# Patient Record
Sex: MALE | Race: WHITE | NOT HISPANIC OR LATINO | Employment: FULL TIME | URBAN - METROPOLITAN AREA
[De-identification: names, ages, dates, MRNs, and addresses within clinical notes are randomized per-mention and may not be internally consistent; named-entity substitution may affect disease eponyms.]

---

## 2023-05-25 LAB
ALANINE AMINOTRANSFERASE (SGPT) (U/L) IN SER/PLAS: 15 U/L (ref 10–52)
ALBUMIN (G/DL) IN SER/PLAS: 4.1 G/DL (ref 3.4–5)
ALKALINE PHOSPHATASE (U/L) IN SER/PLAS: 64 U/L (ref 33–120)
ASPARTATE AMINOTRANSFERASE (SGOT) (U/L) IN SER/PLAS: 19 U/L (ref 9–39)
BILIRUBIN DIRECT (MG/DL) IN SER/PLAS: 0.1 MG/DL (ref 0–0.3)
BILIRUBIN TOTAL (MG/DL) IN SER/PLAS: 0.4 MG/DL (ref 0–1.2)
ERYTHROCYTE DISTRIBUTION WIDTH (RATIO) BY AUTOMATED COUNT: 13.7 % (ref 11.5–14.5)
ERYTHROCYTE MEAN CORPUSCULAR HEMOGLOBIN CONCENTRATION (G/DL) BY AUTOMATED: 31.5 G/DL (ref 32–36)
ERYTHROCYTE MEAN CORPUSCULAR VOLUME (FL) BY AUTOMATED COUNT: 97 FL (ref 80–100)
ERYTHROCYTES (10*6/UL) IN BLOOD BY AUTOMATED COUNT: 4.94 X10E12/L (ref 4.5–5.9)
HEMATOCRIT (%) IN BLOOD BY AUTOMATED COUNT: 47.9 % (ref 41–52)
HEMOGLOBIN (G/DL) IN BLOOD: 15.1 G/DL (ref 13.5–17.5)
LEUKOCYTES (10*3/UL) IN BLOOD BY AUTOMATED COUNT: 8.6 X10E9/L (ref 4.4–11.3)
PLATELETS (10*3/UL) IN BLOOD AUTOMATED COUNT: 160 X10E9/L (ref 150–450)
PROTEIN TOTAL: 6.6 G/DL (ref 6.4–8.2)

## 2023-05-26 LAB
PROSTATE SPECIFIC AG (NG/ML) IN SER/PLAS: 1.74 NG/ML (ref 0–4)
TESTOSTERONE (NG/DL) IN SER/PLAS: 640 NG/DL (ref 240–1000)

## 2023-09-15 ENCOUNTER — HOSPITAL ENCOUNTER (OUTPATIENT)
Dept: DATA CONVERSION | Facility: HOSPITAL | Age: 58
End: 2023-09-15

## 2023-09-15 DIAGNOSIS — R14.0 ABDOMINAL DISTENSION (GASEOUS): ICD-10-CM

## 2023-09-15 DIAGNOSIS — R10.9 UNSPECIFIED ABDOMINAL PAIN: ICD-10-CM

## 2023-10-02 ENCOUNTER — APPOINTMENT (OUTPATIENT)
Dept: RADIOLOGY | Facility: HOSPITAL | Age: 58
End: 2023-10-02
Payer: COMMERCIAL

## 2023-10-03 PROCEDURE — 88304 TISSUE EXAM BY PATHOLOGIST: CPT | Performed by: PATHOLOGY

## 2023-10-03 PROCEDURE — 88304 TISSUE EXAM BY PATHOLOGIST: CPT

## 2023-10-04 ENCOUNTER — LAB REQUISITION (OUTPATIENT)
Dept: LAB | Facility: HOSPITAL | Age: 58
End: 2023-10-04
Payer: COMMERCIAL

## 2023-10-04 DIAGNOSIS — R22.31 LOCALIZED SWELLING, MASS AND LUMP, RIGHT UPPER LIMB: ICD-10-CM

## 2023-10-05 LAB
LABORATORY COMMENT REPORT: NORMAL
PATH REPORT.FINAL DX SPEC: NORMAL
PATH REPORT.GROSS SPEC: NORMAL
PATH REPORT.TOTAL CANCER: NORMAL

## 2023-10-07 ENCOUNTER — HOSPITAL ENCOUNTER (OUTPATIENT)
Dept: RADIOLOGY | Facility: HOSPITAL | Age: 58
Discharge: HOME | End: 2023-10-07
Payer: COMMERCIAL

## 2023-10-07 DIAGNOSIS — R10.9 UNSPECIFIED ABDOMINAL PAIN: ICD-10-CM

## 2023-10-07 DIAGNOSIS — R14.0 ABDOMINAL DISTENSION (GASEOUS): ICD-10-CM

## 2023-10-07 PROCEDURE — 2550000001 HC RX 255 CONTRASTS: Performed by: FAMILY MEDICINE

## 2023-10-07 PROCEDURE — 74177 CT ABD & PELVIS W/CONTRAST: CPT

## 2023-10-07 RX ADMIN — IOHEXOL 75 ML: 350 INJECTION, SOLUTION INTRAVENOUS at 09:57

## 2023-10-11 ENCOUNTER — APPOINTMENT (OUTPATIENT)
Dept: PRIMARY CARE | Facility: CLINIC | Age: 58
End: 2023-10-11
Payer: COMMERCIAL

## 2023-10-19 ENCOUNTER — TELEPHONE (OUTPATIENT)
Dept: PRIMARY CARE | Facility: CLINIC | Age: 58
End: 2023-10-19
Payer: COMMERCIAL

## 2023-10-19 DIAGNOSIS — J90 PLEURAL EFFUSION: ICD-10-CM

## 2023-10-19 DIAGNOSIS — K82.9 GALLBLADDER DISORDER: Primary | ICD-10-CM

## 2023-11-20 PROBLEM — E78.5 HYPERLIPIDEMIA: Status: ACTIVE | Noted: 2023-11-20

## 2023-11-20 PROBLEM — F90.9 ATTENTION DEFICIT HYPERACTIVITY DISORDER (ADHD): Status: ACTIVE | Noted: 2023-11-20

## 2023-11-20 PROBLEM — K22.719 BARRETT'S ESOPHAGUS WITH DYSPLASIA: Status: ACTIVE | Noted: 2017-07-17

## 2023-11-20 PROBLEM — G25.0 ESSENTIAL TREMOR: Status: ACTIVE | Noted: 2023-11-20

## 2023-11-20 PROBLEM — Z78.9 MEDICAL HOME PATIENT: Status: ACTIVE | Noted: 2023-11-20

## 2023-11-20 PROBLEM — E04.1 THYROID NODULE: Status: ACTIVE | Noted: 2023-11-20

## 2023-11-20 PROBLEM — F41.9 ANXIETY AND DEPRESSION: Status: ACTIVE | Noted: 2023-11-20

## 2023-11-20 PROBLEM — R53.81 MALAISE AND FATIGUE: Status: RESOLVED | Noted: 2021-01-06 | Resolved: 2023-11-20

## 2023-11-20 PROBLEM — G47.33 OBSTRUCTIVE SLEEP APNEA SYNDROME: Status: ACTIVE | Noted: 2021-01-06

## 2023-11-20 PROBLEM — E23.7: Status: ACTIVE | Noted: 2023-11-20

## 2023-11-20 PROBLEM — E66.9 OBESITY: Status: ACTIVE | Noted: 2023-11-20

## 2023-11-20 PROBLEM — E29.1 HYPOGONADISM IN MALE: Status: ACTIVE | Noted: 2017-04-18

## 2023-11-20 PROBLEM — I10 ESSENTIAL HYPERTENSION: Status: ACTIVE | Noted: 2023-11-20

## 2023-11-20 PROBLEM — R41.3 MEMORY LOSS: Status: RESOLVED | Noted: 2023-11-20 | Resolved: 2023-11-20

## 2023-11-20 PROBLEM — K21.9 ESOPHAGEAL REFLUX: Status: ACTIVE | Noted: 2023-11-20

## 2023-11-20 PROBLEM — F32.A ANXIETY AND DEPRESSION: Status: ACTIVE | Noted: 2023-11-20

## 2023-11-20 PROBLEM — R53.83 MALAISE AND FATIGUE: Status: RESOLVED | Noted: 2021-01-06 | Resolved: 2023-11-20

## 2023-11-20 PROBLEM — E55.9 VITAMIN D DEFICIENCY: Status: ACTIVE | Noted: 2023-11-20

## 2023-11-20 PROBLEM — R22.31 MASS OF FINGER OF RIGHT HAND: Status: ACTIVE | Noted: 2018-03-12

## 2023-11-20 PROBLEM — F32.9 MAJOR DEPRESSIVE DISORDER, SINGLE EPISODE, UNSPECIFIED: Status: ACTIVE | Noted: 2023-11-20

## 2023-11-20 PROBLEM — R07.9 CHEST PAIN: Status: RESOLVED | Noted: 2021-11-30 | Resolved: 2023-11-20

## 2023-11-20 RX ORDER — PHENYLPROPANOLAMINE/CLEMASTINE 75-1.34MG
200 TABLET, EXTENDED RELEASE ORAL AS NEEDED
COMMUNITY

## 2023-11-20 RX ORDER — ALBUTEROL SULFATE 90 UG/1
2 AEROSOL, METERED RESPIRATORY (INHALATION) EVERY 4 HOURS PRN
COMMUNITY
Start: 2020-12-15

## 2023-11-20 RX ORDER — PROPRANOLOL HYDROCHLORIDE 10 MG/1
5-10 TABLET ORAL 3 TIMES DAILY PRN
COMMUNITY

## 2023-11-20 RX ORDER — NAPROXEN SODIUM 220 MG/1
81 TABLET, FILM COATED ORAL DAILY
COMMUNITY

## 2023-11-20 RX ORDER — ASCORBIC ACID 500 MG
500 TABLET ORAL DAILY
COMMUNITY
Start: 2021-06-10

## 2023-11-20 RX ORDER — CLOMIPHENE CITRATE 50 MG/1
50 TABLET ORAL
COMMUNITY
End: 2024-04-22 | Stop reason: WASHOUT

## 2023-11-20 RX ORDER — PHENYLEPHRINE HCL 10 MG
500 TABLET ORAL DAILY
COMMUNITY

## 2023-11-20 RX ORDER — ONDANSETRON 4 MG/1
8 TABLET, FILM COATED ORAL EVERY 12 HOURS PRN
COMMUNITY
Start: 2023-05-10

## 2023-11-20 RX ORDER — MV-MIN/FOLIC/K1/LYCOPEN/LUTEIN 300-60 MCG
1 TABLET ORAL DAILY
COMMUNITY
Start: 2021-06-10

## 2023-11-20 RX ORDER — ATORVASTATIN CALCIUM 10 MG/1
10 TABLET, FILM COATED ORAL NIGHTLY PRN
COMMUNITY
Start: 2022-05-05 | End: 2024-04-26 | Stop reason: SDUPTHER

## 2023-11-20 RX ORDER — FLUTICASONE FUROATE AND VILANTEROL TRIFENATATE 100; 25 UG/1; UG/1
1 POWDER RESPIRATORY (INHALATION) DAILY
COMMUNITY
Start: 2023-08-27

## 2023-11-20 RX ORDER — TAMSULOSIN HYDROCHLORIDE 0.4 MG/1
0.4 CAPSULE ORAL DAILY
COMMUNITY
Start: 2023-08-12

## 2023-11-20 RX ORDER — MULTIVITAMIN
1 TABLET ORAL
COMMUNITY
Start: 2011-03-03

## 2023-11-20 RX ORDER — PANTOPRAZOLE SODIUM 40 MG/1
40 TABLET, DELAYED RELEASE ORAL
COMMUNITY

## 2023-11-20 RX ORDER — ACETAMINOPHEN 500 MG
5000 TABLET ORAL EVERY 24 HOURS
COMMUNITY

## 2024-02-15 ENCOUNTER — TELEPHONE (OUTPATIENT)
Dept: PRIMARY CARE | Facility: CLINIC | Age: 59
End: 2024-02-15
Payer: COMMERCIAL

## 2024-04-19 ENCOUNTER — APPOINTMENT (OUTPATIENT)
Dept: PRIMARY CARE | Facility: CLINIC | Age: 59
End: 2024-04-19
Payer: COMMERCIAL

## 2024-04-22 ENCOUNTER — TELEPHONE (OUTPATIENT)
Dept: PRIMARY CARE | Facility: CLINIC | Age: 59
End: 2024-04-22

## 2024-04-22 ENCOUNTER — OFFICE VISIT (OUTPATIENT)
Dept: PRIMARY CARE | Facility: CLINIC | Age: 59
End: 2024-04-22
Payer: COMMERCIAL

## 2024-04-22 VITALS
TEMPERATURE: 97.4 F | HEART RATE: 83 BPM | RESPIRATION RATE: 16 BRPM | WEIGHT: 220 LBS | HEIGHT: 68 IN | BODY MASS INDEX: 33.34 KG/M2 | OXYGEN SATURATION: 96 % | DIASTOLIC BLOOD PRESSURE: 78 MMHG | SYSTOLIC BLOOD PRESSURE: 108 MMHG

## 2024-04-22 DIAGNOSIS — R60.9 EDEMA, UNSPECIFIED TYPE: ICD-10-CM

## 2024-04-22 DIAGNOSIS — G62.9 NEUROPATHY: ICD-10-CM

## 2024-04-22 DIAGNOSIS — L30.0 NUMMULAR ECZEMA: Primary | ICD-10-CM

## 2024-04-22 PROCEDURE — 3074F SYST BP LT 130 MM HG: CPT | Performed by: FAMILY MEDICINE

## 2024-04-22 PROCEDURE — 99214 OFFICE O/P EST MOD 30 MIN: CPT | Performed by: FAMILY MEDICINE

## 2024-04-22 PROCEDURE — 1036F TOBACCO NON-USER: CPT | Performed by: FAMILY MEDICINE

## 2024-04-22 PROCEDURE — 3078F DIAST BP <80 MM HG: CPT | Performed by: FAMILY MEDICINE

## 2024-04-22 RX ORDER — TRIAMCINOLONE ACETONIDE 1 MG/G
CREAM TOPICAL 2 TIMES DAILY
Qty: 60 G | Refills: 2 | Status: SHIPPED | OUTPATIENT
Start: 2024-04-22

## 2024-04-22 ASSESSMENT — COLUMBIA-SUICIDE SEVERITY RATING SCALE - C-SSRS
6. HAVE YOU EVER DONE ANYTHING, STARTED TO DO ANYTHING, OR PREPARED TO DO ANYTHING TO END YOUR LIFE?: NO
1. IN THE PAST MONTH, HAVE YOU WISHED YOU WERE DEAD OR WISHED YOU COULD GO TO SLEEP AND NOT WAKE UP?: NO
2. HAVE YOU ACTUALLY HAD ANY THOUGHTS OF KILLING YOURSELF?: NO

## 2024-04-22 ASSESSMENT — ENCOUNTER SYMPTOMS
OCCASIONAL FEELINGS OF UNSTEADINESS: 0
LOSS OF SENSATION IN FEET: 0
DEPRESSION: 0
LEG PAIN: 1

## 2024-04-22 ASSESSMENT — PAIN SCALES - GENERAL: PAINLEVEL: 4

## 2024-04-22 ASSESSMENT — PATIENT HEALTH QUESTIONNAIRE - PHQ9
1. LITTLE INTEREST OR PLEASURE IN DOING THINGS: NOT AT ALL
2. FEELING DOWN, DEPRESSED OR HOPELESS: NOT AT ALL
SUM OF ALL RESPONSES TO PHQ9 QUESTIONS 1 AND 2: 0

## 2024-04-22 NOTE — PROGRESS NOTES
"Subjective   Patient ID: Boston Daniel is a 59 y.o. male who presents for Leg Pain (Pt is here with complaints of bilateral LE with some itching and burning and some skin discoloration.).    Leg Pain      he takes oral b 12 regularly.  He sees Dr Gómez for balance issues and has upcoming appt.  He has had some numbness in his feet for about 6 months also itching and circular patches of scale of lower extremities.     Review of Systems see HPI    Objective   /78 (BP Location: Left arm, Patient Position: Sitting, BP Cuff Size: Adult)   Pulse 83   Temp 36.3 °C (97.4 °F) (Temporal)   Resp 16   Ht 1.727 m (5' 8\")   Wt 99.8 kg (220 lb)   SpO2 96%   BMI 33.45 kg/m²     Physical Exam  Constitutional:       General: He is not in acute distress.     Appearance: Normal appearance.   Cardiovascular:      Rate and Rhythm: Normal rate and regular rhythm.      Heart sounds: No murmur heard.  Pulmonary:      Breath sounds: Normal breath sounds. No wheezing.   Skin:     Comments: 3-4 scattered circular patches bilateral lower extremities.  No central clearing.  Mild edema bilaterally and some moderate varicosities.    Neurological:      Mental Status: He is alert.         Assessment/Plan   Problem List Items Addressed This Visit    None  Visit Diagnoses         Codes    Nummular eczema    -  Primary L30.0    Relevant Medications    triamcinolone (Kenalog) 0.1 % cream    Neuropathy     G62.9    Edema, unspecified type     R60.9          Follow up with neuro near future.  Continue compression stockings.      "

## 2024-04-26 DIAGNOSIS — E78.5 HYPERLIPIDEMIA, UNSPECIFIED HYPERLIPIDEMIA TYPE: Primary | ICD-10-CM

## 2024-04-29 RX ORDER — ATORVASTATIN CALCIUM 10 MG/1
10 TABLET, FILM COATED ORAL DAILY
Qty: 90 TABLET | Refills: 1 | Status: SHIPPED | OUTPATIENT
Start: 2024-04-29

## 2024-06-14 DIAGNOSIS — F41.9 ANXIETY: Primary | ICD-10-CM

## 2024-06-17 RX ORDER — ESCITALOPRAM OXALATE 10 MG/1
30 TABLET ORAL DAILY
Qty: 270 TABLET | Refills: 0 | Status: SHIPPED | OUTPATIENT
Start: 2024-06-17

## 2024-06-18 ENCOUNTER — LAB (OUTPATIENT)
Dept: LAB | Facility: LAB | Age: 59
End: 2024-06-18
Payer: COMMERCIAL

## 2024-06-18 ENCOUNTER — HOSPITAL ENCOUNTER (OUTPATIENT)
Dept: RADIOLOGY | Facility: CLINIC | Age: 59
Discharge: HOME | End: 2024-06-18
Payer: COMMERCIAL

## 2024-06-18 DIAGNOSIS — D29.32 BENIGN NEOPLASM OF LEFT EPIDIDYMIS: ICD-10-CM

## 2024-06-18 DIAGNOSIS — N40.1 BENIGN PROSTATIC HYPERPLASIA WITH LOWER URINARY TRACT SYMPTOMS: Primary | ICD-10-CM

## 2024-06-18 LAB — PSA SERPL-MCNC: 1.6 NG/ML

## 2024-06-18 PROCEDURE — 36415 COLL VENOUS BLD VENIPUNCTURE: CPT

## 2024-06-18 PROCEDURE — 93975 VASCULAR STUDY: CPT

## 2024-06-18 PROCEDURE — 84153 ASSAY OF PSA TOTAL: CPT

## 2024-06-18 PROCEDURE — 76870 US EXAM SCROTUM: CPT | Performed by: STUDENT IN AN ORGANIZED HEALTH CARE EDUCATION/TRAINING PROGRAM

## 2024-07-01 ENCOUNTER — LAB (OUTPATIENT)
Dept: LAB | Facility: LAB | Age: 59
End: 2024-07-01
Payer: COMMERCIAL

## 2024-07-01 DIAGNOSIS — R93.819 ABNORMAL RADIOLOGIC FINDINGS ON DIAGNOSTIC IMAGING OF UNSPECIFIED TESTICLE: ICD-10-CM

## 2024-07-01 DIAGNOSIS — R93.819 ABNORMAL RADIOLOGIC FINDINGS ON DIAGNOSTIC IMAGING OF UNSPECIFIED TESTICLE: Primary | ICD-10-CM

## 2024-07-01 LAB — HCG SERPL-ACNC: <1 MIU/ML

## 2024-07-01 PROCEDURE — 83615 LACTATE (LD) (LDH) ENZYME: CPT

## 2024-07-01 PROCEDURE — 36415 COLL VENOUS BLD VENIPUNCTURE: CPT

## 2024-07-01 PROCEDURE — 84702 CHORIONIC GONADOTROPIN TEST: CPT

## 2024-07-02 LAB — LDH SERPL-CCNC: 170 U/L (ref 91–227)

## 2024-07-10 ENCOUNTER — APPOINTMENT (OUTPATIENT)
Dept: RADIOLOGY | Facility: HOSPITAL | Age: 59
End: 2024-07-10
Payer: COMMERCIAL

## 2024-07-10 ENCOUNTER — HOSPITAL ENCOUNTER (EMERGENCY)
Facility: HOSPITAL | Age: 59
Discharge: HOME | End: 2024-07-10
Attending: EMERGENCY MEDICINE
Payer: COMMERCIAL

## 2024-07-10 ENCOUNTER — OFFICE VISIT (OUTPATIENT)
Dept: NEUROLOGY | Facility: CLINIC | Age: 59
End: 2024-07-10
Payer: COMMERCIAL

## 2024-07-10 ENCOUNTER — LAB (OUTPATIENT)
Dept: LAB | Facility: LAB | Age: 59
End: 2024-07-10
Payer: COMMERCIAL

## 2024-07-10 VITALS
HEART RATE: 79 BPM | RESPIRATION RATE: 16 BRPM | HEIGHT: 68 IN | WEIGHT: 217 LBS | OXYGEN SATURATION: 95 % | BODY MASS INDEX: 32.89 KG/M2 | SYSTOLIC BLOOD PRESSURE: 145 MMHG | TEMPERATURE: 97.7 F | DIASTOLIC BLOOD PRESSURE: 94 MMHG

## 2024-07-10 DIAGNOSIS — R20.2 NUMBNESS AND TINGLING: Primary | ICD-10-CM

## 2024-07-10 DIAGNOSIS — R10.32 GROIN PAIN, CHRONIC, LEFT: Primary | ICD-10-CM

## 2024-07-10 DIAGNOSIS — G89.29 GROIN PAIN, CHRONIC, LEFT: Primary | ICD-10-CM

## 2024-07-10 DIAGNOSIS — G25.0 ESSENTIAL TREMOR: ICD-10-CM

## 2024-07-10 DIAGNOSIS — R20.0 NUMBNESS AND TINGLING: Primary | ICD-10-CM

## 2024-07-10 DIAGNOSIS — R20.0 NUMBNESS AND TINGLING: ICD-10-CM

## 2024-07-10 DIAGNOSIS — R20.2 NUMBNESS AND TINGLING: ICD-10-CM

## 2024-07-10 LAB
ANION GAP SERPL CALC-SCNC: 8 MMOL/L
APPEARANCE UR: CLEAR
BASOPHILS # BLD AUTO: 0.03 X10*3/UL (ref 0–0.1)
BASOPHILS NFR BLD AUTO: 0.3 %
BILIRUB UR STRIP.AUTO-MCNC: NEGATIVE MG/DL
BUN SERPL-MCNC: 13 MG/DL (ref 8–25)
CALCIUM SERPL-MCNC: 9.3 MG/DL (ref 8.5–10.4)
CHLORIDE SERPL-SCNC: 104 MMOL/L (ref 97–107)
CO2 SERPL-SCNC: 28 MMOL/L (ref 24–31)
COLOR UR: YELLOW
CREAT SERPL-MCNC: 0.9 MG/DL (ref 0.4–1.6)
EGFRCR SERPLBLD CKD-EPI 2021: >90 ML/MIN/1.73M*2
EOSINOPHIL # BLD AUTO: 0.1 X10*3/UL (ref 0–0.7)
EOSINOPHIL NFR BLD AUTO: 1.1 %
ERYTHROCYTE [DISTWIDTH] IN BLOOD BY AUTOMATED COUNT: 13.5 % (ref 11.5–14.5)
ERYTHROCYTE [SEDIMENTATION RATE] IN BLOOD BY WESTERGREN METHOD: 16 MM/H (ref 0–20)
GLUCOSE SERPL-MCNC: 98 MG/DL (ref 65–99)
GLUCOSE UR STRIP.AUTO-MCNC: NORMAL MG/DL
HCT VFR BLD AUTO: 45.3 % (ref 41–52)
HGB BLD-MCNC: 14.8 G/DL (ref 13.5–17.5)
IMM GRANULOCYTES # BLD AUTO: 0.04 X10*3/UL (ref 0–0.7)
IMM GRANULOCYTES NFR BLD AUTO: 0.4 % (ref 0–0.9)
KETONES UR STRIP.AUTO-MCNC: NEGATIVE MG/DL
LEUKOCYTE ESTERASE UR QL STRIP.AUTO: NEGATIVE
LYMPHOCYTES # BLD AUTO: 2.07 X10*3/UL (ref 1.2–4.8)
LYMPHOCYTES NFR BLD AUTO: 22.3 %
MCH RBC QN AUTO: 30 PG (ref 26–34)
MCHC RBC AUTO-ENTMCNC: 32.7 G/DL (ref 32–36)
MCV RBC AUTO: 92 FL (ref 80–100)
MONOCYTES # BLD AUTO: 0.72 X10*3/UL (ref 0.1–1)
MONOCYTES NFR BLD AUTO: 7.8 %
NEUTROPHILS # BLD AUTO: 6.32 X10*3/UL (ref 1.2–7.7)
NEUTROPHILS NFR BLD AUTO: 68.1 %
NITRITE UR QL STRIP.AUTO: NEGATIVE
NRBC BLD-RTO: 0 /100 WBCS (ref 0–0)
PH UR STRIP.AUTO: 7 [PH]
PLATELET # BLD AUTO: 202 X10*3/UL (ref 150–450)
POTASSIUM SERPL-SCNC: 4.2 MMOL/L (ref 3.4–5.1)
PROT UR STRIP.AUTO-MCNC: NEGATIVE MG/DL
RBC # BLD AUTO: 4.93 X10*6/UL (ref 4.5–5.9)
RBC # UR STRIP.AUTO: NEGATIVE /UL
SODIUM SERPL-SCNC: 140 MMOL/L (ref 133–145)
SP GR UR STRIP.AUTO: 1.01
TSH SERPL DL<=0.05 MIU/L-ACNC: 0.93 MIU/L (ref 0.27–4.2)
UROBILINOGEN UR STRIP.AUTO-MCNC: NORMAL MG/DL
VIT B12 SERPL-MCNC: >2000 PG/ML (ref 211–946)
WBC # BLD AUTO: 9.3 X10*3/UL (ref 4.4–11.3)

## 2024-07-10 PROCEDURE — 96361 HYDRATE IV INFUSION ADD-ON: CPT

## 2024-07-10 PROCEDURE — 84443 ASSAY THYROID STIM HORMONE: CPT

## 2024-07-10 PROCEDURE — 96375 TX/PRO/DX INJ NEW DRUG ADDON: CPT

## 2024-07-10 PROCEDURE — 82374 ASSAY BLOOD CARBON DIOXIDE: CPT | Performed by: EMERGENCY MEDICINE

## 2024-07-10 PROCEDURE — 74176 CT ABD & PELVIS W/O CONTRAST: CPT

## 2024-07-10 PROCEDURE — 81003 URINALYSIS AUTO W/O SCOPE: CPT | Performed by: EMERGENCY MEDICINE

## 2024-07-10 PROCEDURE — 99285 EMERGENCY DEPT VISIT HI MDM: CPT | Mod: 25

## 2024-07-10 PROCEDURE — 96374 THER/PROPH/DIAG INJ IV PUSH: CPT

## 2024-07-10 PROCEDURE — 82607 VITAMIN B-12: CPT

## 2024-07-10 PROCEDURE — 99214 OFFICE O/P EST MOD 30 MIN: CPT | Performed by: PSYCHIATRY & NEUROLOGY

## 2024-07-10 PROCEDURE — 1036F TOBACCO NON-USER: CPT | Performed by: PSYCHIATRY & NEUROLOGY

## 2024-07-10 PROCEDURE — 85652 RBC SED RATE AUTOMATED: CPT

## 2024-07-10 PROCEDURE — 85025 COMPLETE CBC W/AUTO DIFF WBC: CPT | Performed by: EMERGENCY MEDICINE

## 2024-07-10 PROCEDURE — 93975 VASCULAR STUDY: CPT

## 2024-07-10 PROCEDURE — 93975 VASCULAR STUDY: CPT | Performed by: RADIOLOGY

## 2024-07-10 PROCEDURE — 74176 CT ABD & PELVIS W/O CONTRAST: CPT | Performed by: RADIOLOGY

## 2024-07-10 PROCEDURE — 36415 COLL VENOUS BLD VENIPUNCTURE: CPT | Performed by: EMERGENCY MEDICINE

## 2024-07-10 PROCEDURE — 36415 COLL VENOUS BLD VENIPUNCTURE: CPT

## 2024-07-10 PROCEDURE — 86334 IMMUNOFIX E-PHORESIS SERUM: CPT

## 2024-07-10 PROCEDURE — 76870 US EXAM SCROTUM: CPT | Performed by: RADIOLOGY

## 2024-07-10 PROCEDURE — 84165 PROTEIN E-PHORESIS SERUM: CPT

## 2024-07-10 PROCEDURE — 2500000004 HC RX 250 GENERAL PHARMACY W/ HCPCS (ALT 636 FOR OP/ED): Performed by: EMERGENCY MEDICINE

## 2024-07-10 PROCEDURE — 84155 ASSAY OF PROTEIN SERUM: CPT

## 2024-07-10 RX ORDER — ONDANSETRON HYDROCHLORIDE 2 MG/ML
4 INJECTION, SOLUTION INTRAVENOUS ONCE
Status: COMPLETED | OUTPATIENT
Start: 2024-07-10 | End: 2024-07-10

## 2024-07-10 RX ORDER — KETOROLAC TROMETHAMINE 30 MG/ML
30 INJECTION, SOLUTION INTRAMUSCULAR; INTRAVENOUS ONCE
Status: COMPLETED | OUTPATIENT
Start: 2024-07-10 | End: 2024-07-10

## 2024-07-10 RX ORDER — NAPROXEN 500 MG/1
500 TABLET ORAL 2 TIMES DAILY PRN
Qty: 14 TABLET | Refills: 0 | Status: SHIPPED | OUTPATIENT
Start: 2024-07-10 | End: 2024-07-17

## 2024-07-10 ASSESSMENT — PAIN - FUNCTIONAL ASSESSMENT
PAIN_FUNCTIONAL_ASSESSMENT: 0-10
PAIN_FUNCTIONAL_ASSESSMENT: 0-10

## 2024-07-10 ASSESSMENT — PAIN DESCRIPTION - PAIN TYPE
TYPE: ACUTE PAIN
TYPE: ACUTE PAIN

## 2024-07-10 ASSESSMENT — PAIN DESCRIPTION - LOCATION
LOCATION: GROIN
LOCATION: GROIN

## 2024-07-10 ASSESSMENT — PAIN SCALES - GENERAL
PAINLEVEL_OUTOF10: 7
PAINLEVEL_OUTOF10: 3

## 2024-07-10 ASSESSMENT — PAIN DESCRIPTION - PROGRESSION: CLINICAL_PROGRESSION: GRADUALLY IMPROVING

## 2024-07-10 ASSESSMENT — PAIN DESCRIPTION - DESCRIPTORS: DESCRIPTORS: ACHING

## 2024-07-10 ASSESSMENT — PAIN DESCRIPTION - ORIENTATION: ORIENTATION: LEFT;LOWER

## 2024-07-10 NOTE — DISCHARGE INSTRUCTIONS
The exact cause of your abdominal and scrotal pain is unclear at this time, but could be related to strain.  Take naproxen as needed for pain.  Follow-up with your urologist for further management.    Diet as tolerated.    More than 50% of abdominal pain that comes to the Emergency Department goes undiagnosed and that there were no emergent findings in your workup today.  If your symptoms get worse, developed high fever, or persistent vomiting you should come back to the Emergency Department. Often times an appendicitis, colitis, diverticulitis, cholelithiasis and many other such illnesses are undetectable early on in their course and will not be seen on the first emergency department visit.  I recommend that you be re-examined in 12-24 hours if your symptoms are not significantly improved.

## 2024-07-10 NOTE — PROGRESS NOTES
Chief complaint:    Hand tremor; new numbness in his feet    History of Present Illness:   Tremor of head and hands comes and goes. Coffee makes it worse, alcohol makes it better. Propranolol 10 mg helps. He took propranolol twice in the past year when he was getting interviewed. He did get a new job.     Six months ago he started noticing numbness and tingling in his feet, more in his right foot. Symptoms come and go in his left foot but are constant in the right foot. He has not had trouble walking or going up and down stairs. His balance is pretty good and he has not fallen. No hand numbness or tingling. He occasionally has low back pain after physical activity but has not had radicular pain. He has no leg weakness.       Physical examination:  He is tyler pleasant man who looks well. He has good DP pulses.    Neurologic Exam     Mental Status   His speech was clear, fluent and appropriate.      Cranial Nerves     CN II   Visual fields full to confrontation.     CN III, IV, VI   Extraocular motions are normal.     CN V   Facial sensation intact.     CN VII   Facial expression full, symmetric.     Motor Exam   Muscle bulk: normal  Overall muscle tone: normal    Strength   Right deltoid: 5/5  Left deltoid: 5/5  Right biceps: 5/5  Left biceps: 5/5  Right interossei: 5/5  Left interossei: 5/5  Right iliopsoas: 5/5  Left iliopsoas: 5/5  Finger tapping was brisk bilaterally. No head or hand tremor was evident.      Sensory Exam   Impaired sharp sensation in a gradient from the right toes to the top of the foot. Sharp sensation intact on the other limbs.      Gait, Coordination, and Reflexes     Reflexes   Right brachioradialis: 2+  Left brachioradialis: 2+  Right biceps: 2+  Left biceps: 2+  Right triceps: 2+  Left triceps: 2+  Right patellar: 2+  Left patellar: 2+  Right achilles: 1+  Left achilles: 1+  Right plantar: normal  Left plantar: normal  His gait was narrow-based and steady but he had trouble with tandem  gait. He could take 1-2 tandem steps before reaching out to the wall or taking a step sideways.           1. Numbness and tingling  Vitamin B12    TSH with reflex to Free T4 if abnormal    Serum Protein Electrophoresis + Immunofixation    Sedimentation Rate      2. Essential tremor               Orders Placed This Encounter   Procedures    Vitamin B12     Standing Status:   Future     Standing Expiration Date:   7/10/2025     Order Specific Question:   Release result to MyChart     Answer:   Immediate [1]    TSH with reflex to Free T4 if abnormal     Standing Status:   Future     Standing Expiration Date:   7/10/2025     Order Specific Question:   Release result to MyChart     Answer:   Immediate [1]    Serum Protein Electrophoresis + Immunofixation     Standing Status:   Future     Standing Expiration Date:   7/10/2025     Order Specific Question:   Release result to MyChart     Answer:   Immediate [1]    Sedimentation Rate     Standing Status:   Future     Standing Expiration Date:   7/10/2025     Order Specific Question:   Release result to MyChart     Answer:   Immediate [1]          Impression and Plan:  Boston's essential tremor remains mild and intermittent. He will continue propranolol 10 mg as needed, which he took only twice in the past year when he went for job interviews.     He has developed progressive numbness and tingling in his feet suggestive of a neuropathy. He will have blood tests for common causes of neuropathy. His non-fasting glucose was 98 this morning. He is at risk for diabetes due to his family history, and he has gained weight, so he will work on losing weight.    I will notify him of the test results. I will retire in September so he was given a list of local  neurologists, two of whom are neuromuscular specialists.     Valery Gómez MD

## 2024-07-10 NOTE — Clinical Note
Boston Daniel was seen and treated in our emergency department on 7/10/2024.  He may return to work on 07/11/2024.       If you have any questions or concerns, please don't hesitate to call.      Iliana Vargas MD

## 2024-07-10 NOTE — PATIENT INSTRUCTIONS
You do not have much head or hand tremor today. Please take propranolol 10 mg as needed. You have developed numbness and tingling in your right foot and intermittently in your left foot. Your balance is a little off. Both may be symptoms of a neuropathy. Please have blood tests to check for common causes of neuropathy. You are at risk for diabetes, which commonly runs in families. Diabetes, or even elevated glucoses which do not meet the criteria for diabetes can cause neuropathy. Please work on losing weight. You were successful by exercising regularly earlier this year, so please make it a habit again. Watch your caffeine intake after lunch and make sure you get enough sleep.     I will notify you of the blood test results and we will decide what to do next. I will retire at the end of September. Please ask Dr. Charles to refill propranolol as needed. You will likely still need to follow up with a neurologist in the future, so I recommend you see either Dr. Meade in [place] or Dr. Barraza in [place].

## 2024-07-10 NOTE — ED PROVIDER NOTES
HPI   Chief Complaint   Patient presents with    Groin Injury     Urology found cyst on testicular US 2 weeks ago, was lifting something at the store and felt a pop in the groin area. Pt is extremely uncomfortable in triage at this time. Guarding to L lower groin area       59-year-old male presents for evaluation of left lower quadrant abdominal pain rating to his left groin and testicle.  States he has had left testicular pain for quite some time following with Dr. Thomas about 1 month ago had an ultrasound that per my review of chart shows nonspecific nodules and varicocele.  He did have some follow-up outpatient test but is not certain there is any particular plan with regard to these findings.  States he is having intermittent left lower quadrant abdominal pain without relieving or exacerbating factors.  No vomiting.  Thought this might have been from lifting a heavy case of water at [employer] but worse over the past couple of weeks and more specifically over the past couple of days.                          [place] Coma Scale Score: 15                     Patient History   Past Medical History:   Diagnosis Date    Chest pain 11/30/2021    Malaise and fatigue 01/06/2021     Past Surgical History:   Procedure Laterality Date    MR HEAD ANGIO WO IV CONTRAST  2/14/2023    MR HEAD ANGIO WO IV CONTRAST [facility]    OTHER SURGICAL HISTORY  06/10/2021    Appendectomy    OTHER SURGICAL HISTORY  06/10/2021    Tonsillectomy     No family history on file.  Social History     Tobacco Use    Smoking status: Former     Types: Cigarettes     Start date: 2002    Smokeless tobacco: Never   Substance Use Topics    Alcohol use: Yes     Comment: pt drinks socially    Drug use: Never       Physical Exam   ED Triage Vitals   Temperature Heart Rate Respirations BP   07/10/24 0944 07/10/24 0944 07/10/24 0944 07/10/24 0944   36.6 °C (97.8 °F) 89 20 (!) 145/91      Pulse Ox Temp Source Heart Rate Source Patient Position    07/10/24 0944 07/10/24 0944 07/10/24 0944 07/10/24 1002   100 % Oral Monitor Sitting      BP Location FiO2 (%)     07/10/24 1002 --     Left arm        Physical Exam  Vitals and nursing note reviewed.     General: Vitals reviewed. Awake, alert, well-developed, well-nourished, NAD  HEENT: NC/AT, PERRL, MMM  Neck: Supple, trachea midline  Respiratory: No respiratory distress, lungs clear to auscultation bilaterally, no wheezes, rhonchi, or rales  CV: Regular rate and regular rhythm, no murmur/gallop/rubs, 2+ bilateral femoral pulses  Abdomen/GI: Soft, mild tenderness palpation left lower quadrant/suprapubic region, no palpable hernias, non-distended, no rebound, guarding, or rigidity, normal bowel sounds  : Normal external genitalia, tender to palpation over the left posterior testicle near the epididymis area, no redness, no swelling, normal lie, normal cremasteric reflex bilaterally, no palpable inguinal/scrotal hernia  Extremities: Moving all extremities, no deformities  Neuro: A/O, normal speech  Skin: Warm, dry. No rashes identified    ED Course & MDM   Diagnoses as of 07/10/24 1353   Groin pain, chronic, left       Medical Decision Making  59-year-old male presents for left lower quadrant and scrotal pain ongoing for extended period of time worse over the past few days to weeks.  Ultrasound reviewed with nonspecific nodules and varicocele recommended short-term follow-up.  Could be this contributing may also be component of groin strain or hernia although no palpable hernia found on exam.  Tender to palpation left lower quadrant with worsening colicky pain also consider kidney stone among others.  Will obtain labs, repeat ultrasound scrotum, CT abdomen pelvis and treat symptomatically with IV Toradol.  Patient feeling improved after symptomatic treatment.  Scrotal ultrasound with no acute change compared to prior no other acute finding.  CT abdomen pelvis was moderate enlargement of the prostate but no other  acute finding.  Suspect may be muscle strain or other inflammatory process that can be treated with supportive care.  Written prescription for naproxen and referred back to urology and/or PCP for follow-up.  Wife does report he has follow-up with a urologist already scheduled.    Discussed that more than 50% of abdominal pain that comes to the Emergency Department goes undiagnosed and that there were no emergent findings in workup today. Discussed that certain diagnoese such as appendicitis, colitis, diverticulitis, cholelithiasis or other illnesses are undetectable early on in their course and may not be seen on the first visit.  I recommended abdominal re-examination in 12-24 hours if  symptoms are not significantly improved, sooner if worsening.       Amount and/or Complexity of Data Reviewed  External Data Reviewed: radiology.     Details: 6/18/2024 scrotal ultrasound reviewedIMPRESSION:  1. No acute scrotal pathology.  2. Few left intratesticular vague/ill-defined nodules measuring up to  0.5 cm which remains nonspecific and could be related to prior insult  (traumatic or inflammatory), however advise attention on short-term  follow-up to ensure stability and no growth as alternative  differential diagnosis would include testicular neoplasm.  3. Bilateral mild varicocele measuring up to 0.3 cm. Echogenic  content within those dilated varicoceles mainly in the left side with  preserved intrinsic flow likely related to sluggish flow, and less  likely thrombosis.      Recommendation:  Short-term follow-up by ultrasound to reassess the left  intratesticular nonspecific nodules.    Labs: ordered. Decision-making details documented in ED Course.  Radiology: ordered and independent interpretation performed. Decision-making details documented in ED Course.        Procedure  Procedures     Iliana Vargas MD  07/10/24 4932

## 2024-07-11 ENCOUNTER — APPOINTMENT (OUTPATIENT)
Dept: PRIMARY CARE | Facility: CLINIC | Age: 59
End: 2024-07-11
Payer: COMMERCIAL

## 2024-07-11 LAB — PROT SERPL-MCNC: 6.6 G/DL (ref 6.4–8.2)

## 2024-07-16 LAB
ALBUMIN: 3.9 G/DL (ref 3.4–5)
ALPHA 1 GLOBULIN: 0.3 G/DL (ref 0.2–0.6)
ALPHA 2 GLOBULIN: 0.6 G/DL (ref 0.4–1.1)
BETA GLOBULIN: 0.8 G/DL (ref 0.5–1.2)
GAMMA GLOBULIN: 1 G/DL (ref 0.5–1.4)
IMMUNOFIXATION COMMENT: NORMAL
PATH REVIEW - SERUM IMMUNOFIXATION: NORMAL
PATH REVIEW-SERUM PROTEIN ELECTROPHORESIS: NORMAL
PROTEIN ELECTROPHORESIS COMMENT: NORMAL

## 2024-07-24 ENCOUNTER — TELEPHONE (OUTPATIENT)
Dept: NEUROLOGY | Facility: CLINIC | Age: 59
End: 2024-07-24
Payer: COMMERCIAL

## 2024-07-24 NOTE — TELEPHONE ENCOUNTER
Spoke to Boston after his blood work was notable only for elevated B12. He takes a multivitamin and has stopped the B12 supplement. The other tests were fine. We discussed whether to do an EMG. He plans to consult one of the local neuromuscular specialists.

## 2024-08-30 ENCOUNTER — OFFICE VISIT (OUTPATIENT)
Dept: PRIMARY CARE | Facility: CLINIC | Age: 59
End: 2024-08-30
Payer: COMMERCIAL

## 2024-08-30 ENCOUNTER — TELEPHONE (OUTPATIENT)
Dept: PRIMARY CARE | Facility: CLINIC | Age: 59
End: 2024-08-30

## 2024-08-30 VITALS
OXYGEN SATURATION: 96 % | HEIGHT: 68 IN | TEMPERATURE: 97.3 F | SYSTOLIC BLOOD PRESSURE: 122 MMHG | DIASTOLIC BLOOD PRESSURE: 78 MMHG | WEIGHT: 225 LBS | BODY MASS INDEX: 34.1 KG/M2 | RESPIRATION RATE: 16 BRPM | HEART RATE: 85 BPM

## 2024-08-30 DIAGNOSIS — E53.8 VITAMIN B 12 DEFICIENCY: ICD-10-CM

## 2024-08-30 DIAGNOSIS — S16.1XXA STRAIN OF NECK MUSCLE, INITIAL ENCOUNTER: Primary | ICD-10-CM

## 2024-08-30 LAB — VIT B12 SERPL-MCNC: 1098 PG/ML (ref 211–946)

## 2024-08-30 PROCEDURE — 3074F SYST BP LT 130 MM HG: CPT | Performed by: FAMILY MEDICINE

## 2024-08-30 PROCEDURE — 36415 COLL VENOUS BLD VENIPUNCTURE: CPT | Performed by: FAMILY MEDICINE

## 2024-08-30 PROCEDURE — 99213 OFFICE O/P EST LOW 20 MIN: CPT | Performed by: FAMILY MEDICINE

## 2024-08-30 PROCEDURE — 82607 VITAMIN B-12: CPT | Performed by: FAMILY MEDICINE

## 2024-08-30 PROCEDURE — 3008F BODY MASS INDEX DOCD: CPT | Performed by: FAMILY MEDICINE

## 2024-08-30 PROCEDURE — 3078F DIAST BP <80 MM HG: CPT | Performed by: FAMILY MEDICINE

## 2024-08-30 PROCEDURE — 1036F TOBACCO NON-USER: CPT | Performed by: FAMILY MEDICINE

## 2024-08-30 ASSESSMENT — COLUMBIA-SUICIDE SEVERITY RATING SCALE - C-SSRS
2. HAVE YOU ACTUALLY HAD ANY THOUGHTS OF KILLING YOURSELF?: NO
6. HAVE YOU EVER DONE ANYTHING, STARTED TO DO ANYTHING, OR PREPARED TO DO ANYTHING TO END YOUR LIFE?: NO
1. IN THE PAST MONTH, HAVE YOU WISHED YOU WERE DEAD OR WISHED YOU COULD GO TO SLEEP AND NOT WAKE UP?: NO

## 2024-08-30 ASSESSMENT — PATIENT HEALTH QUESTIONNAIRE - PHQ9
1. LITTLE INTEREST OR PLEASURE IN DOING THINGS: NOT AT ALL
SUM OF ALL RESPONSES TO PHQ9 QUESTIONS 1 AND 2: 0
2. FEELING DOWN, DEPRESSED OR HOPELESS: NOT AT ALL

## 2024-08-30 ASSESSMENT — ENCOUNTER SYMPTOMS
LOSS OF SENSATION IN FEET: 0
OCCASIONAL FEELINGS OF UNSTEADINESS: 0
DEPRESSION: 0

## 2024-08-30 ASSESSMENT — PAIN SCALES - GENERAL: PAINLEVEL: 4

## 2024-08-30 NOTE — PROGRESS NOTES
"Subjective   Patient ID: [place] Daniel is a 59 y.o. male who presents for Neck Pain and Shoulder Pain (Pt is here with complaints of neck and shoulder pain. Pt would also like his b12 level checked.).    HPI trouble turning to the left and some neck and trap pain.  He had dry needling in the past that helped and would like to do this and massotherapy.    Would like his b12 checked and neuro had thought he was on too high of a dose that he was self administrating.  He has been off of b12 for bout 8 wks.  He was previously on SL b12.     Review of Systems  See HPI  Objective   /78 (BP Location: Left arm, Patient Position: Sitting, BP Cuff Size: Adult)   Pulse 85   Temp 36.3 °C (97.3 °F) (Temporal)   Resp 16   Ht 1.727 m (5' 8\")   Wt 102 kg (225 lb)   SpO2 96%   BMI 34.21 kg/m²     Physical Exam  Constitutional:       General: He is not in acute distress.     Appearance: Normal appearance.   Cardiovascular:      Rate and Rhythm: Normal rate and regular rhythm.      Heart sounds: No murmur heard.  Pulmonary:      Breath sounds: Normal breath sounds. No wheezing.   Musculoskeletal:      Comments: Some left lower paracervical tenderness extending into left trap.  Decreased left rotation to 50 degrees.  No current radicular symptoms.  Mildly restricted right rotation.  Good FB/BB.      Neurological:      Mental Status: He is alert.         Assessment/Plan   Problem List Items Addressed This Visit    None  Visit Diagnoses         Codes    Strain of neck muscle, initial encounter    -  Primary S16.1XXA    Relevant Orders    Referral to Physical Therapy    Vitamin B 12 deficiency     E53.8    Relevant Orders    Vitamin B12               "

## 2024-09-04 DIAGNOSIS — R79.89 HIGH SERUM VITAMIN B12: ICD-10-CM

## 2024-09-16 ENCOUNTER — APPOINTMENT (OUTPATIENT)
Dept: PHYSICAL THERAPY | Facility: CLINIC | Age: 59
End: 2024-09-16
Payer: COMMERCIAL

## 2024-09-24 NOTE — PROGRESS NOTES
Physical Therapy Evaluation    Patient Name: Boston Daniel  MRN: [id number]  Evaluation Date: 9/25/2024  Time Calculation  Start Time: 0730  Stop Time: 0813  Time Calculation (min): 43 min  PT Evaluation Time Entry  PT Evaluation (Low) Time Entry: 30     PT Therapeutic Procedures Time Entry  Therapeutic Exercise Time Entry: 13    Problem List Items Addressed This Visit             ICD-10-CM    Cervical strain S16.1XXA    Relevant Orders    Follow Up In Physical Therapy       Subjective   Patient reported hx of condition: Pt reports chronic hx of neck pain when looking that worsened significantly over the past 6 months. He cannot recall any clear JOSE. Pain is on the L side of his neck and upper trap. He denies any radicular symptoms. L cervical rotation is limited and his biggest deficit, does not notice much weakness down LUE. He gets a lot of headaches on the L side of his head. He sees a massotherapist 1x/month which does help. Pt ordered before considering imaging or further treatment.     Precautions:  Precautions  Precautions Comment: None    Relevant PMH:  Asthma, headaches    Red Flags: Do you have any of the following? No  Fever/chills, unexplained weight changes, dizziness/fainting, unexplained change in bowel or bladder functions, unexplained malaise or muscle weakness, night pain/sweats, numbness or tingling    Pain:  Pain Assessment: 0-10  0-10 (Numeric) Pain Score: 3 (Pain at max 6/10)  Pain Type: Chronic pain  Pain Location: Neck  Pain Orientation: Left  Pain Radiating Towards: does not radiate  Effect of Pain on Daily Activities: Pain present when driving/looking to L, sleeping, looking down (reading)    Home Living:  Home type: House  Lives with: Family  Occupation: full time job doing IT  Work tasks: desk work    Prior Function Per Pt/Caregiver Report:  Prior Function Comments: Functionally independent with intermittent neck pain    Patient's Goal for PT:  Improve pain-free ROM, decrease muscle  spasm    OBJECTIVE:  Objective   Posture:  Posture Comment: Forward head and rounded shoulders, mild increase in thoracic kyphosis  Range of Motion:  Cervical AROM Range   Flexion Full   Extension Full   R rotation 60 deg   L rotation 40 deg, painful   R sidebend 40 deg, tight    L sidebend 40 deg, tight     Strength:  UE MMT L R   Shoulder flexion 4/5 5/5   Shoulder extension 4/5 5/5   Shoulder abduction 4/5 5/5   Shoulder ER 4+/5 5/5   Shoulder IR 5/5 5/5   Low, mid trap 4+/5 RUE, 4/5 with pain LUE    Flexibility:  Flexibility Comment: Tight L>R upper trap, B pecs  Palpation:  Palpation Comment: TTP L upper trapm levator, cervical paraspinals, suboccipitals  Special Tests:  Special Tests Comment: Positive cervial distraction, all other special testing negative      Outcome Measures:  Other Measures  Neck Disability Index: 9     Assessment  PT Assessment Results: Decreased strength, Decreased range of motion, Decreased mobility, Pain  Rehab Prognosis: Good    Pt is a 59 y.o. male who presents with impairments listed above. These impairments have led to functional limitations including intermittent pain with ADLs as well as pain with cervical rotation when driving, pain with desk/computer work and reading, headaches, difficulty sleeping, and pain when lifting with LUE. Pt would benefit from skilled physical therapy intervention to improve above impairments and facilitate return to function.    Plan  Treatment/Interventions: Dry needling, Electrical stimulation, Mechanical traction, Manual therapy, Neuromuscular re-education, Taping techniques, Ultrasound, Therapeutic exercises, Therapeutic activities  PT Plan: Skilled PT  PT Frequency: 2 times per week  Duration: 10 visits  Certification Period Start Date: 09/25/24  Certification Period End Date: 12/24/24  Number of Treatments Authorized: 60/yr  Rehab Potential: Good  Plan of Care Agreement: Patient    Plan for next visit: assess response to HEP, manual techniques  "including DN to reduce spasm, postural exercise to tolerance    OP EDUCATION:  Outpatient Education  Individual(s) Educated: Patient  Education Provided: Anatomy, Body Mechanics, Home Exercise Program, POC, Posture  Diagnosis and Precautions: L sided cervical pain, L upper trap spasm, faulty posture  Risk and Benefits Discussed with Patient/Caregiver/Other: yes  Patient/Caregiver Demonstrated Understanding: yes  Plan of Care Discussed and Agreed Upon: yes  Patient Response to Education: Patient/Caregiver Verbalized Understanding of Information, Patient/Caregiver Performed Return Demonstration of Exercises/Activities, Patient/Caregiver Asked Appropriate Questions    Today's Treatment:  Therapeutic Exercise  HEP issued, demonstrated, instructed pt in exercises, exercises include:  Access Code: [email or web address]  URL: [email or web address]  Date: 09/25/2024  Prepared by: Alonzo Abbott    Exercises  - Seated Scapular Retraction  - 1 x daily - 7 x weekly - 2 sets - 10 reps - 3-5 seond hold  - Seated Cervical Retraction  - 1 x daily - 7 x weekly - 2 sets - 10 reps - 3-5 seconds hold  - Seated Upper Trapezius Stretch  - 1 x daily - 7 x weekly - 1 sets - 3 reps - 30 second hold  - Corner Pec Minor Stretch  - 1 x daily - 7 x weekly - 1 sets - 3 reps - 30 hold  - Supine Suboccipital Release with Tennis Balls  - 1 x daily - 7 x weekly - 2 sets - 10 reps - 30-60\"  hold    Goals:  Active       PT Problem       STG, 5 visits       Start:  09/25/24    Expected End:  11/09/24       Pt will be independent in HEP to improve posture, UE strength, and function.  Pt will demonstrate 50 deg L cervical rotation without pain for decreased pain with driving.  Pt will demonstrate appropriate sitting, standing, and dynamic posture at the beginning and throughout a session with minimal cues for decreased cervical strain during functional tasks.          Pt will demonstrate 60 deg L cervical rotation AROM  without pain for improved cervical " mobility during ADLs and driving.        Start:  09/25/24    Expected End:  12/24/24            Pt will increase strength in L shoulder by 1/2 MMT in all planes for improved performance of functional activities.        Start:  09/25/24    Expected End:  12/24/24            Pt will perform medium weight lifting tasks with appropriate posture and without pain for improved performance of ADLs.         Start:  09/25/24    Expected End:  12/24/24            Pt will sit and work at desk for up to 1 hour with appropriate ergonomic setup and without onset of pain.         Start:  09/25/24    Expected End:  12/24/24            Pt will demonstrate appropriate sitting, standing, and dynamic posture at the beginning and throughout a session without cues for decreased cervical strain during functional tasks.         Start:  09/25/24    Expected End:  12/24/24

## 2024-09-25 ENCOUNTER — EVALUATION (OUTPATIENT)
Dept: PHYSICAL THERAPY | Facility: CLINIC | Age: 59
End: 2024-09-25
Payer: COMMERCIAL

## 2024-09-25 DIAGNOSIS — S16.1XXA STRAIN OF NECK MUSCLE, INITIAL ENCOUNTER: ICD-10-CM

## 2024-09-25 PROCEDURE — 97161 PT EVAL LOW COMPLEX 20 MIN: CPT | Mod: GP

## 2024-09-25 PROCEDURE — 97110 THERAPEUTIC EXERCISES: CPT | Mod: GP

## 2024-09-25 ASSESSMENT — PAIN SCALES - GENERAL: PAINLEVEL_OUTOF10: 3

## 2024-09-25 ASSESSMENT — PAIN - FUNCTIONAL ASSESSMENT: PAIN_FUNCTIONAL_ASSESSMENT: 0-10

## 2024-10-03 ENCOUNTER — TREATMENT (OUTPATIENT)
Dept: PHYSICAL THERAPY | Facility: CLINIC | Age: 59
End: 2024-10-03
Payer: COMMERCIAL

## 2024-10-03 DIAGNOSIS — S16.1XXA STRAIN OF NECK MUSCLE, INITIAL ENCOUNTER: ICD-10-CM

## 2024-10-03 PROCEDURE — 97140 MANUAL THERAPY 1/> REGIONS: CPT | Mod: GP,CQ

## 2024-10-03 PROCEDURE — 97110 THERAPEUTIC EXERCISES: CPT | Mod: GP,CQ

## 2024-10-03 ASSESSMENT — PAIN SCALES - GENERAL: PAINLEVEL_OUTOF10: 2

## 2024-10-03 ASSESSMENT — PAIN - FUNCTIONAL ASSESSMENT: PAIN_FUNCTIONAL_ASSESSMENT: 0-10

## 2024-10-03 NOTE — PROGRESS NOTES
Physical Therapy Treatment    Patient Name: Boston Daniel  MRN: [id number]  Encounter date:  10/3/2024  Time Calculation  Start Time: 1715  Stop Time: 1800  Time Calculation (min): 45 min     PT Therapeutic Procedures Time Entry  Manual Therapy Time Entry: 23  Therapeutic Exercise Time Entry: 20    Visit Number:  2 (including evaluation)  Planned total visits: 10  Visit Authorized/insurance coverage:  NO AUTH, 3200 DED-NOT MET, 80/20 COVERAGE, 60V PT/OT/ST, 5000 OOP-NOT MET, [employer]  Progress Report due visit #10    Plan for next visit: assess response to HEP, manual techniques including DN to reduce spasm, postural exercise to tolerance     Current Problem  Problem List Items Addressed This Visit             ICD-10-CM    Cervical strain S16.1XXA      Precautions  Precautions  Precautions Comment: None    Pain  Pain Assessment: 0-10  0-10 (Numeric) Pain Score: 2  Pain Type: Chronic pain  Pain Location: Neck  Pain Orientation: Left    Subjective  General   HEP going well no difficulty.        Objective  180 degrees AAROM flexion but painful after 130 degrees - instructed to keep in 130 degrees range     Treatment:  Pulley flexion and scaption 2'     Seated   ER orange TB x10   Horizontal abduction orange tb x 10  Both aggravated shoulder     Supine with wedge AAROM with cane   Chest press x 10   IR/ER x 10   Flexion lap to 90 degrees x10   Flexion 90 degrees to 120 degrees x 10   Flexion lap to 130  degrees x10   Horizontal abduction x10     Supine with wedge   PROM on all planes x10 including PDF D1 and D2       Current HEP:  HEP issued, demonstrated, instructed pt in exercises, exercises include:  Access Code: [email or web address]  URL: [email or web address]  Date: 09/25/2024  Prepared by: Alonzo Abbott     Exercises  - Seated Scapular Retraction  - 1 x daily - 7 x weekly - 2 sets - 10 reps - 3-5 seond hold  - Seated Cervical Retraction  - 1 x daily - 7 x weekly - 2 sets - 10 reps - 3-5 seconds hold  - Seated Upper  "Trapezius Stretch  - 1 x daily - 7 x weekly - 1 sets - 3 reps - 30 second hold  - Corner Pec Minor Stretch  - 1 x daily - 7 x weekly - 1 sets - 3 reps - 30 hold  - Supine Suboccipital Release with Tennis Balls  - 1 x daily - 7 x weekly - 2 sets - 10 reps - 30-60\"  hold    Manual:    STM with deep prep in seated to left shoulder. Proximal pectoralis TTP.      OP EDUCATION:   Body mechanics education for postural awareness with ADL's.   Pt. given verbal, and demonstrative cues for most listed below. Rationale given for all as instructed. Handouts issued.    Pt. instructed in proper posture with standing dynamic ADL's. With instruction in the following but not limited to as pt. Bring up examples: Brushing teeth, washing dishes, vacuuming, carrying items, mowing, yard work, household work, proper form to pick items up from floor and place on surface.      Also covered: static seated proper spinal position, Proper posture with social media, reading, sewing. Etc. Use of lumbar support at home and in car.     Assessment:   Initiated manual and gentle AAROM that aggravated symptoms.     Limitations/deficits:  Weakness, ROM limited and affects function, and poor posture awareness.     Pain end of session:  \"Up a little.\"     Plan:     Continue with current POC/no changes    Assessment of current progress against goals:  Insufficient treatment time to assess progress    Goals:  Active       PT Problem       STG, 5 visits       Start:  09/25/24    Expected End:  11/09/24       Pt will be independent in HEP to improve posture, UE strength, and function.  Pt will demonstrate 50 deg L cervical rotation without pain for decreased pain with driving.  Pt will demonstrate appropriate sitting, standing, and dynamic posture at the beginning and throughout a session with minimal cues for decreased cervical strain during functional tasks.          Pt will demonstrate 60 deg L cervical rotation AROM  without pain for improved cervical " mobility during ADLs and driving.        Start:  09/25/24    Expected End:  12/24/24            Pt will increase strength in L shoulder by 1/2 MMT in all planes for improved performance of functional activities.        Start:  09/25/24    Expected End:  12/24/24            Pt will perform medium weight lifting tasks with appropriate posture and without pain for improved performance of ADLs.         Start:  09/25/24    Expected End:  12/24/24            Pt will sit and work at desk for up to 1 hour with appropriate ergonomic setup and without onset of pain.         Start:  09/25/24    Expected End:  12/24/24            Pt will demonstrate appropriate sitting, standing, and dynamic posture at the beginning and throughout a session without cues for decreased cervical strain during functional tasks.         Start:  09/25/24    Expected End:  12/24/24

## 2024-10-09 NOTE — PROGRESS NOTES
Physical Therapy Treatment    Patient Name: Boston Daniel  MRN: [id number]  Encounter date:  10/10/2024  Time Calculation  Start Time: 0732  Stop Time: 0815  Time Calculation (min): 43 min     PT Therapeutic Procedures Time Entry  Manual Therapy Time Entry: 40    Visit Number:  3 (including evaluation)  Planned total visits: 10 visits  Visits Authorized/Insurance Coverage:  NO AUTH, 3200 DED-NOT MET, 80/20 COVERAGE, 60V PT/OT/ST, 5000 OOP-NOT MET, [employer]     Current Problem  Problem List Items Addressed This Visit             ICD-10-CM    Cervical strain S16.1XXA       Precautions  Precautions  Precautions Comment: None    Pain  Pain Assessment: 0-10  0-10 (Numeric) Pain Score: 2    Subjective  Pt notes minimal change overall since eval. He continues with constant pain around L upper trap. He has been consistent with exercises, notices more pain after, especially with chin tucks.     Objective  TTP with spasm L upper trap    Treatment:     Manual cervical distraction, PA glides C3-C7 grade III, sideglides B C3-C7 greade III, first rib mobs L grade III, gentle STM L upper trap    Dry needling: Pt educated in benefits, risks, contraindications, and potential adverse reactions to needling. Consent provided to proceed with needling. Area mapped and important landmarks marked for safety, as well as palpation of tender and painful points in tissue. Pt positioned in supine,     3 0.3x25mm needles inserted into L upper trap, needles pistoned and removed quickly    Needles removed, pt showed no signs of adverse reaction, universal precautions maintained.    Discussed with pt use of theracane for self massage to upper trap  Current HEP:  Access Code: [email or web address]  URL: [email or web address]  Date: 09/25/2024  Prepared by: Alonzo Abbott    Exercises  - Seated Scapular Retraction  - 1 x daily - 7 x weekly - 2 sets - 10 reps - 3-5 seond hold  - Seated Cervical Retraction  - 1 x daily - 7 x weekly - 2 sets - 10 reps - 3-5  "seconds hold  - Seated Upper Trapezius Stretch  - 1 x daily - 7 x weekly - 1 sets - 3 reps - 30 second hold  - Corner Pec Minor Stretch  - 1 x daily - 7 x weekly - 1 sets - 3 reps - 30 hold  - Supine Suboccipital Release with Tennis Balls  - 1 x daily - 7 x weekly - 2 sets - 10 reps - 30-60\"  hold    Assessment:  Pt's response to treatment:  Manual techniques used today to decrease pain and spasm. Pt guarded initially but improved over time. Pt with less pain and better mobility following.   Areas of improvements:  reduced pain and spasm  Limitations/deficits:  posture    Pain end of session: 1/10    Plan:  OP PT Plan  Treatment/Interventions: Dry needling, Electrical stimulation, Mechanical traction, Manual therapy, Neuromuscular re-education, Taping techniques, Ultrasound, Therapeutic exercises, Therapeutic activities  PT Plan: Skilled PT  PT Frequency: 2 times per week  Duration: 10 visits  Certification Period Start Date: 09/25/24  Certification Period End Date: 12/24/24  Number of Treatments Authorized: 60/yr  Rehab Potential: Good  Plan of Care Agreement: Patient  Continue with current POC/no changes    Assessment of current progress against goals:  Symptomatic relief with treatment    Goals:  Active       PT Problem       STG, 5 visits       Start:  09/25/24    Expected End:  11/09/24       Pt will be independent in HEP to improve posture, UE strength, and function.  Pt will demonstrate 50 deg L cervical rotation without pain for decreased pain with driving.  Pt will demonstrate appropriate sitting, standing, and dynamic posture at the beginning and throughout a session with minimal cues for decreased cervical strain during functional tasks.          Pt will demonstrate 60 deg L cervical rotation AROM  without pain for improved cervical mobility during ADLs and driving.        Start:  09/25/24    Expected End:  12/24/24            Pt will increase strength in L shoulder by 1/2 MMT in all planes for improved " performance of functional activities.        Start:  09/25/24    Expected End:  12/24/24            Pt will perform medium weight lifting tasks with appropriate posture and without pain for improved performance of ADLs.         Start:  09/25/24    Expected End:  12/24/24            Pt will sit and work at desk for up to 1 hour with appropriate ergonomic setup and without onset of pain.         Start:  09/25/24    Expected End:  12/24/24            Pt will demonstrate appropriate sitting, standing, and dynamic posture at the beginning and throughout a session without cues for decreased cervical strain during functional tasks.         Start:  09/25/24    Expected End:  12/24/24

## 2024-10-10 ENCOUNTER — TREATMENT (OUTPATIENT)
Dept: PHYSICAL THERAPY | Facility: CLINIC | Age: 59
End: 2024-10-10
Payer: COMMERCIAL

## 2024-10-10 DIAGNOSIS — S16.1XXA STRAIN OF NECK MUSCLE, INITIAL ENCOUNTER: ICD-10-CM

## 2024-10-10 PROCEDURE — 97140 MANUAL THERAPY 1/> REGIONS: CPT | Mod: GP

## 2024-10-10 ASSESSMENT — PAIN - FUNCTIONAL ASSESSMENT: PAIN_FUNCTIONAL_ASSESSMENT: 0-10

## 2024-10-10 ASSESSMENT — PAIN SCALES - GENERAL: PAINLEVEL_OUTOF10: 2

## 2024-10-14 ENCOUNTER — TREATMENT (OUTPATIENT)
Dept: PHYSICAL THERAPY | Facility: CLINIC | Age: 59
End: 2024-10-14
Payer: COMMERCIAL

## 2024-10-14 DIAGNOSIS — S16.1XXA STRAIN OF NECK MUSCLE, INITIAL ENCOUNTER: ICD-10-CM

## 2024-10-14 PROCEDURE — 97110 THERAPEUTIC EXERCISES: CPT | Mod: GP,CQ

## 2024-10-14 PROCEDURE — 97140 MANUAL THERAPY 1/> REGIONS: CPT | Mod: GP,CQ

## 2024-10-14 ASSESSMENT — PAIN SCALES - GENERAL: PAINLEVEL_OUTOF10: 5 - MODERATE PAIN

## 2024-10-14 ASSESSMENT — PAIN - FUNCTIONAL ASSESSMENT: PAIN_FUNCTIONAL_ASSESSMENT: 0-10

## 2024-10-14 NOTE — PROGRESS NOTES
"    Physical Therapy Treatment    Patient Name: Boston Daniel  MRN: [id number]  Encounter date:  10/14/2024  Time Calculation  Start Time: 0730  Stop Time: 0815  Time Calculation (min): 45 min     PT Therapeutic Procedures Time Entry  Manual Therapy Time Entry: 15  Therapeutic Exercise Time Entry: 25    Visit Number:  4 (including evaluation)  Planned total visits:    Visits Authorized/Insurance Coverage:  NO AUTH, 3200 DED-NOT MET, 80/20 COVERAGE, 60V PT/OT/ST, 5000 OOP-NOT MET, [employer]     Current Problem  Problem List Items Addressed This Visit             ICD-10-CM    Cervical strain S16.1XXA     Precautions  Precautions  Precautions Comment: None    Pain  Pain Assessment: 0-10  0-10 (Numeric) Pain Score: 5 - Moderate pain    Subjective  \"I did not like the DN last time. They did electrodes last time and it was better. I am still sore from it.\" \"I have caught myself lifting the left shoulder.\"    Objective  Tight upper trap bilateral     Treatment:  Pulleys x 20 flexion and scaption   Forward flexion on blue SB in seated 2x10     Ladder with peach theraband   B Rows x10   B Ext x 10   Add 2x5 L>R difficult     Standing scaption and flexion to 90 degrees 1#     Door pec stretch 20\"x 2 - low     Seated upper trap stretch with anchor 20\"x3 R/L     Manual cervical distraction, PA glides C3-C7 grade III, sideglides B C3-C7 greade III, first rib mobs L grade III, gentle STM L upper trap      Current HEP:  Access Code: [email or web address]  URL: [email or web address]  Date: 09/25/2024  Prepared by: Alonzo Abbott    Exercises  - Seated Scapular Retraction  - 1 x daily - 7 x weekly - 2 sets - 10 reps - 3-5 seond hold  - Seated Cervical Retraction  - 1 x daily - 7 x weekly - 2 sets - 10 reps - 3-5 seconds hold  - Seated Upper Trapezius Stretch  - 1 x daily - 7 x weekly - 1 sets - 3 reps - 30 second hold  - Corner Pec Minor Stretch  - 1 x daily - 7 x weekly - 1 sets - 3 reps - 30 hold  - Supine Suboccipital Release with Tennis Balls " " - 1 x daily - 7 x weekly - 2 sets - 10 reps - 30-60\"  hold    Assessment:  Pt's response to treatment:  Exercises aggravated shoulder and neck but manual decreased pain.   Areas of improvements:  Pain down at exit.   Limitations/deficits: Bilateral upper trap tightness and tenderness. Sleeping and ADL's impaired.     Pain end of session: \"Better\"     Plan:     Continue with current POC/no changes    Assessment of current progress against goals:  Symptomatic relief with treatment    Goals:  Active       PT Problem       STG, 5 visits       Start:  09/25/24    Expected End:  11/09/24       Pt will be independent in HEP to improve posture, UE strength, and function.  Pt will demonstrate 50 deg L cervical rotation without pain for decreased pain with driving.  Pt will demonstrate appropriate sitting, standing, and dynamic posture at the beginning and throughout a session with minimal cues for decreased cervical strain during functional tasks.          Pt will demonstrate 60 deg L cervical rotation AROM  without pain for improved cervical mobility during ADLs and driving.        Start:  09/25/24    Expected End:  12/24/24            Pt will increase strength in L shoulder by 1/2 MMT in all planes for improved performance of functional activities.        Start:  09/25/24    Expected End:  12/24/24            Pt will perform medium weight lifting tasks with appropriate posture and without pain for improved performance of ADLs.         Start:  09/25/24    Expected End:  12/24/24            Pt will sit and work at desk for up to 1 hour with appropriate ergonomic setup and without onset of pain.         Start:  09/25/24    Expected End:  12/24/24            Pt will demonstrate appropriate sitting, standing, and dynamic posture at the beginning and throughout a session without cues for decreased cervical strain during functional tasks.         Start:  09/25/24    Expected End:  12/24/24              "

## 2024-10-15 NOTE — PROGRESS NOTES
"    Physical Therapy Treatment    Patient Name: Boston Daniel  MRN: [id number]  Encounter date:  10/16/2024  Time Calculation  Start Time: 0817  Stop Time: 0857  Time Calculation (min): 40 min     PT Therapeutic Procedures Time Entry  Manual Therapy Time Entry: 40    Visit Number:  5 (including evaluation)  Planned total visits: 10 visits  Visits Authorized/Insurance Coverage:  NO AUTH, 3200 DED-NOT MET, 80/20 COVERAGE, 60V PT/OT/ST, 5000 OOP-NOT MET, [employer]     Current Problem  Problem List Items Addressed This Visit             ICD-10-CM    Cervical strain S16.1XXA       Precautions  Precautions  Precautions Comment: None    Pain  Pain Assessment: 0-10  0-10 (Numeric) Pain Score: 1    Subjective  Pain is minimal today. Pt felt DN last week made him worse, is interested in doing it with e-stim. Other than that, he notices good progress towards his goals.     Objective  Trigger point/spasm around distal upper trap and surpraspinatus    Treatment:  Manual Therapy  Manual Therapy Performed: Yes  Manual cervical distraction, PA glides C3-C7 grade III, sideglides B C3-C7 greade III, first rib mobs L grade III, TpR and STM L upper trap    Current HEP:  Access Code: [email or web address]  URL: [email or web address]  Date: 09/25/2024  Prepared by: Alonzo Abbott    Exercises  - Seated Scapular Retraction  - 1 x daily - 7 x weekly - 2 sets - 10 reps - 3-5 seond hold  - Seated Cervical Retraction  - 1 x daily - 7 x weekly - 2 sets - 10 reps - 3-5 seconds hold  - Seated Upper Trapezius Stretch  - 1 x daily - 7 x weekly - 1 sets - 3 reps - 30 second hold  - Corner Pec Minor Stretch  - 1 x daily - 7 x weekly - 1 sets - 3 reps - 30 hold  - Supine Suboccipital Release with Tennis Balls  - 1 x daily - 7 x weekly - 2 sets - 10 reps - 30-60\"  hold    Assessment:  Pt's response to treatment: DN deferred per pt request due to pain last time. Session continued to focus on manual techniques, pt less guarded than last week but some spasm still " present. This improved well throughout visit.   Areas of improvements: postural awareness  Limitations/deficits: continued muscle spasms     Pain end of session: 0/10    Plan:  OP PT Plan  Treatment/Interventions: Dry needling, Electrical stimulation, Mechanical traction, Manual therapy, Neuromuscular re-education, Taping techniques, Ultrasound, Therapeutic exercises, Therapeutic activities  PT Plan: Skilled PT  PT Frequency: 2 times per week  Duration: 10 visits  Certification Period Start Date: 09/25/24  Certification Period End Date: 12/24/24  Number of Treatments Authorized: 60/yr  Rehab Potential: Good  Plan of Care Agreement: Patient  Continue with current POC/no changes    Assessment of current progress against goals:  Progressing toward functional goals    Goals:  Active       PT Problem       STG, 5 visits       Start:  09/25/24    Expected End:  11/09/24       Pt will be independent in HEP to improve posture, UE strength, and function.  Pt will demonstrate 50 deg L cervical rotation without pain for decreased pain with driving.  Pt will demonstrate appropriate sitting, standing, and dynamic posture at the beginning and throughout a session with minimal cues for decreased cervical strain during functional tasks.          Pt will demonstrate 60 deg L cervical rotation AROM  without pain for improved cervical mobility during ADLs and driving.        Start:  09/25/24    Expected End:  12/24/24            Pt will increase strength in L shoulder by 1/2 MMT in all planes for improved performance of functional activities.        Start:  09/25/24    Expected End:  12/24/24            Pt will perform medium weight lifting tasks with appropriate posture and without pain for improved performance of ADLs.         Start:  09/25/24    Expected End:  12/24/24            Pt will sit and work at desk for up to 1 hour with appropriate ergonomic setup and without onset of pain.         Start:  09/25/24    Expected End:   12/24/24            Pt will demonstrate appropriate sitting, standing, and dynamic posture at the beginning and throughout a session without cues for decreased cervical strain during functional tasks.         Start:  09/25/24    Expected End:  12/24/24

## 2024-10-16 ENCOUNTER — TREATMENT (OUTPATIENT)
Dept: PHYSICAL THERAPY | Facility: CLINIC | Age: 59
End: 2024-10-16
Payer: COMMERCIAL

## 2024-10-16 DIAGNOSIS — S16.1XXA STRAIN OF NECK MUSCLE, INITIAL ENCOUNTER: ICD-10-CM

## 2024-10-16 PROCEDURE — 97140 MANUAL THERAPY 1/> REGIONS: CPT | Mod: GP

## 2024-10-16 ASSESSMENT — PAIN SCALES - GENERAL: PAINLEVEL_OUTOF10: 1

## 2024-10-16 ASSESSMENT — PAIN - FUNCTIONAL ASSESSMENT: PAIN_FUNCTIONAL_ASSESSMENT: 0-10

## 2024-10-21 ENCOUNTER — TREATMENT (OUTPATIENT)
Dept: PHYSICAL THERAPY | Facility: CLINIC | Age: 59
End: 2024-10-21
Payer: COMMERCIAL

## 2024-10-21 DIAGNOSIS — S16.1XXA STRAIN OF NECK MUSCLE, INITIAL ENCOUNTER: ICD-10-CM

## 2024-10-21 PROCEDURE — 97110 THERAPEUTIC EXERCISES: CPT | Mod: GP,CQ

## 2024-10-21 PROCEDURE — 97140 MANUAL THERAPY 1/> REGIONS: CPT | Mod: GP,CQ

## 2024-10-21 ASSESSMENT — PAIN - FUNCTIONAL ASSESSMENT: PAIN_FUNCTIONAL_ASSESSMENT: 0-10

## 2024-10-21 ASSESSMENT — PAIN SCALES - GENERAL: PAINLEVEL_OUTOF10: 1

## 2024-10-21 NOTE — PROGRESS NOTES
"    Physical Therapy Treatment    Patient Name: Boston Daniel  MRN: [id number]  Encounter date:  10/21/2024  Time Calculation  Start Time: 0730  Stop Time: 0815  Time Calculation (min): 45 min     PT Therapeutic Procedures Time Entry  Manual Therapy Time Entry: 15  Therapeutic Exercise Time Entry: 25    Visit Number:  6 (including evaluation)  Planned total visits:  10 visits   Visits Authorized/Insurance Coverage:  NO AUTH, 3200 DED-NOT MET, 80/20 COVERAGE, 60V PT/OT/ST, 5000 OOP-NOT MET, [employer]     Current Problem  Problem List Items Addressed This Visit             ICD-10-CM    Cervical strain S16.1XXA     Precautions  Precautions  Precautions Comment: None    Pain  Pain Assessment: 0-10  0-10 (Numeric) Pain Score: 1    Subjective  \"It is a lot better than it was.\" \"I am trying to watch my posture, always adjusting.\"   Short lived soreness after sessions.    Objective  Cervical rotation at eval  R rotation 60 deg  L rotation 40 deg and painful     Cervical rotation at todau, 10/21   R rotation 70 deg  L rotation 65 deg and tight no pain      Treatment:  UBE level 1 - 2 min fwd and retro     Ladder orange tband 2x10   Rows  Ext   ER with towel   Adduction    mid door stretch 20\" x 3     Seated -  orange tband 2x10   Horizontal abduction    Seated 1#   Flexion and scaption to 90 degrees          Manual cervical distraction, upper trap stretch, cervical rotation stretch, pectoralis stretch, STM bilateral CPS    Current HEP:  Access Code: [email or web address]  URL: [email or web address]  Date: 09/25/2024  Prepared by: Alonzo Abbott    Exercises  - Seated Scapular Retraction  - 1 x daily - 7 x weekly - 2 sets - 10 reps - 3-5 seond hold  - Seated Cervical Retraction  - 1 x daily - 7 x weekly - 2 sets - 10 reps - 3-5 seconds hold  - Seated Upper Trapezius Stretch  - 1 x daily - 7 x weekly - 1 sets - 3 reps - 30 second hold  - Corner Pec Minor Stretch  - 1 x daily - 7 x weekly - 1 sets - 3 reps - 30 hold  - Supine Suboccipital " "Release with Tennis Balls  - 1 x daily - 7 x weekly - 2 sets - 10 reps - 30-60\"  hold    Assessment:   Response to session: Good. No increase in discomfort. Cervical ROM increased from eval.   Areas of improvements:  Posture and cervical rotation   Limitations/deficits:   Weakness of postural musculature.      Pain end of session:  \"Same but loose.\"    Plan:     Continue with current POC/no changes    Assessment of current progress against goals:  Progressing toward functional goals     Goals:  Active       PT Problem       STG, 5 visits       Start:  09/25/24    Expected End:  11/09/24       Pt will be independent in HEP to improve posture, UE strength, and function.  Pt will demonstrate 50 deg L cervical rotation without pain for decreased pain with driving.  Pt will demonstrate appropriate sitting, standing, and dynamic posture at the beginning and throughout a session with minimal cues for decreased cervical strain during functional tasks.          Pt will demonstrate 60 deg L cervical rotation AROM  without pain for improved cervical mobility during ADLs and driving.        Start:  09/25/24    Expected End:  12/24/24            Pt will increase strength in L shoulder by 1/2 MMT in all planes for improved performance of functional activities.        Start:  09/25/24    Expected End:  12/24/24            Pt will perform medium weight lifting tasks with appropriate posture and without pain for improved performance of ADLs.         Start:  09/25/24    Expected End:  12/24/24            Pt will sit and work at desk for up to 1 hour with appropriate ergonomic setup and without onset of pain.         Start:  09/25/24    Expected End:  12/24/24            Pt will demonstrate appropriate sitting, standing, and dynamic posture at the beginning and throughout a session without cues for decreased cervical strain during functional tasks.         Start:  09/25/24    Expected End:  12/24/24              "

## 2024-10-23 DIAGNOSIS — E78.5 HYPERLIPIDEMIA, UNSPECIFIED HYPERLIPIDEMIA TYPE: ICD-10-CM

## 2024-10-23 NOTE — PROGRESS NOTES
Physical Therapy Treatment    Patient Name: Boston Daniel  MRN: [id number]  Encounter date:  10/24/2024  Time Calculation  Start Time: 0730  Stop Time: 0814  Time Calculation (min): 44 min     PT Therapeutic Procedures Time Entry  Manual Therapy Time Entry: 20  Therapeutic Exercise Time Entry: 24    Visit Number:  7 (including evaluation)  Planned total visits: 10 visits  Visits Authorized/Insurance Coverage:  NO AUTH, 3200 DED-NOT MET, 80/20 COVERAGE, 60V PT/OT/ST, 5000 OOP-NOT MET, [employer]     Current Problem  Problem List Items Addressed This Visit             ICD-10-CM    Cervical strain S16.1XXA         Precautions  Precautions  Precautions Comment: None    Pain  Pain Assessment: 0-10  0-10 (Numeric) Pain Score: 0 - No pain    Subjective  Pt notes very little pain, shoulder has been improving significantly. He does still notice some shoulder elevation throughout the day but he is able to correct with less discomfort.    Objective  Very mild tenderness L upper trap. Somewhat forward shoulder posture but corrects without cueing    Treatment:  Manual Therapy  Manual Therapy Performed: Yes  Manual cervical distraction, PA glides C3-C7 grade III, sideglides B C3-C7 greade III, first rib mobs L grade III, TpR and STM L upper trap    Therapeutic Exercise  Therapeutic Exercise Performed: Yes  Prone row 2x10 4#  Prone ITY no weight 2x10    Flexion wall slide x15  Wall pushup+ 2x10     Band row 2x10 level 3  Band extension 2x10 level 3  Band ER 2x10 level 3      Current HEP:  Access Code: [email or web address]  URL: [email or web address]  Date: 09/25/2024  Prepared by: Alonzo Abbott    Exercises  - Seated Scapular Retraction  - 1 x daily - 7 x weekly - 2 sets - 10 reps - 3-5 seond hold  - Seated Cervical Retraction  - 1 x daily - 7 x weekly - 2 sets - 10 reps - 3-5 seconds hold  - Seated Upper Trapezius Stretch  - 1 x daily - 7 x weekly - 1 sets - 3 reps - 30 second hold  - Corner Pec Minor Stretch  - 1 x daily - 7 x weekly - 1  "sets - 3 reps - 30 hold  - Supine Suboccipital Release with Tennis Balls  - 1 x daily - 7 x weekly - 2 sets - 10 reps - 30-60\"  hold    Access Code: [email or web address]  URL: [email or web address]  Date: 10/24/2024  Prepared by: Alonzo Abbott    Exercises  - Wall Push Up with Plus  - 1 x daily - 7 x weekly - 2 sets - 10 reps  - Shoulder External Rotation with Anchored Resistance  - 1 x daily - 7 x weekly - 2 sets - 10 reps  - Standing Shoulder Row with Anchored Resistance  - 1 x daily - 7 x weekly - 2 sets - 10 reps  - Shoulder extension with resistance - Neutral  - 1 x daily - 7 x weekly - 2 sets - 10 reps    Assessment:  Pt's response to treatment: Pt with significant symptom improvement recently so reduced time spent with manual techniques. Pt less tender to touch L upper trap. Pt had good tolerance to exercise but did have increased fatigue L shoulder compared to R.   Areas of improvements: pain levels  Limitations/deficits: L shoulder weakness     Pain end of session: 0/10    Plan:  OP PT Plan  Treatment/Interventions: Dry needling, Electrical stimulation, Mechanical traction, Manual therapy, Neuromuscular re-education, Taping techniques, Ultrasound, Therapeutic exercises, Therapeutic activities  PT Plan: Skilled PT  PT Frequency: 2 times per week  Duration: 10 visits  Certification Period Start Date: 09/25/24  Certification Period End Date: 12/24/24  Number of Treatments Authorized: 60/yr  Rehab Potential: Good  Plan of Care Agreement: Patient  Continue with current POC/no changes    Assessment of current progress against goals:  Progressing toward functional goals    Goals:  Active       PT Problem       STG, 5 visits       Start:  09/25/24    Expected End:  11/09/24       Pt will be independent in HEP to improve posture, UE strength, and function.  Pt will demonstrate 50 deg L cervical rotation without pain for decreased pain with driving.  Pt will demonstrate appropriate sitting, standing, and dynamic posture " at the beginning and throughout a session with minimal cues for decreased cervical strain during functional tasks.          Pt will demonstrate 60 deg L cervical rotation AROM  without pain for improved cervical mobility during ADLs and driving.        Start:  09/25/24    Expected End:  12/24/24            Pt will increase strength in L shoulder by 1/2 MMT in all planes for improved performance of functional activities.        Start:  09/25/24    Expected End:  12/24/24            Pt will perform medium weight lifting tasks with appropriate posture and without pain for improved performance of ADLs.         Start:  09/25/24    Expected End:  12/24/24            Pt will sit and work at desk for up to 1 hour with appropriate ergonomic setup and without onset of pain.         Start:  09/25/24    Expected End:  12/24/24            Pt will demonstrate appropriate sitting, standing, and dynamic posture at the beginning and throughout a session without cues for decreased cervical strain during functional tasks.         Start:  09/25/24    Expected End:  12/24/24

## 2024-10-24 ENCOUNTER — TREATMENT (OUTPATIENT)
Dept: PHYSICAL THERAPY | Facility: CLINIC | Age: 59
End: 2024-10-24
Payer: COMMERCIAL

## 2024-10-24 DIAGNOSIS — S16.1XXA STRAIN OF NECK MUSCLE, INITIAL ENCOUNTER: ICD-10-CM

## 2024-10-24 PROCEDURE — 97110 THERAPEUTIC EXERCISES: CPT | Mod: GP

## 2024-10-24 PROCEDURE — 97140 MANUAL THERAPY 1/> REGIONS: CPT | Mod: GP

## 2024-10-24 RX ORDER — ATORVASTATIN CALCIUM 10 MG/1
10 TABLET, FILM COATED ORAL DAILY
Qty: 90 TABLET | Refills: 3 | Status: SHIPPED | OUTPATIENT
Start: 2024-10-24

## 2024-10-24 ASSESSMENT — PAIN SCALES - GENERAL: PAINLEVEL_OUTOF10: 0 - NO PAIN

## 2024-10-24 ASSESSMENT — PAIN - FUNCTIONAL ASSESSMENT: PAIN_FUNCTIONAL_ASSESSMENT: 0-10

## 2024-10-28 ENCOUNTER — TREATMENT (OUTPATIENT)
Dept: PHYSICAL THERAPY | Facility: CLINIC | Age: 59
End: 2024-10-28
Payer: COMMERCIAL

## 2024-10-28 DIAGNOSIS — S16.1XXA STRAIN OF NECK MUSCLE, INITIAL ENCOUNTER: ICD-10-CM

## 2024-10-28 PROCEDURE — 97110 THERAPEUTIC EXERCISES: CPT | Mod: GP,CQ

## 2024-10-28 ASSESSMENT — PAIN - FUNCTIONAL ASSESSMENT: PAIN_FUNCTIONAL_ASSESSMENT: 0-10

## 2024-10-28 ASSESSMENT — PAIN SCALES - GENERAL: PAINLEVEL_OUTOF10: 0 - NO PAIN

## 2024-10-30 ENCOUNTER — APPOINTMENT (OUTPATIENT)
Dept: UROLOGY | Facility: CLINIC | Age: 59
End: 2024-10-30
Payer: COMMERCIAL

## 2024-10-31 ENCOUNTER — TREATMENT (OUTPATIENT)
Dept: PHYSICAL THERAPY | Facility: CLINIC | Age: 59
End: 2024-10-31
Payer: COMMERCIAL

## 2024-10-31 DIAGNOSIS — S16.1XXA STRAIN OF NECK MUSCLE, INITIAL ENCOUNTER: ICD-10-CM

## 2024-10-31 PROCEDURE — 97110 THERAPEUTIC EXERCISES: CPT | Mod: GP

## 2024-10-31 PROCEDURE — 97140 MANUAL THERAPY 1/> REGIONS: CPT | Mod: GP

## 2024-10-31 ASSESSMENT — PAIN SCALES - GENERAL: PAINLEVEL_OUTOF10: 0 - NO PAIN

## 2024-10-31 ASSESSMENT — PAIN - FUNCTIONAL ASSESSMENT: PAIN_FUNCTIONAL_ASSESSMENT: 0-10

## 2024-11-04 NOTE — PROGRESS NOTES
"    Physical Therapy Treatment and Discharge Summary    Patient Name: Boston Daniel  MRN: [id number]  Encounter date:  11/5/2024  Time Calculation  Start Time: 0732  Stop Time: 0812  Time Calculation (min): 40 min     PT Therapeutic Procedures Time Entry  Manual Therapy Time Entry: 13  Therapeutic Exercise Time Entry: 27    Visit Number:  10 (including evaluation)  Planned total visits: 10 visits  Visits Authorized/Insurance Coverage:  NO AUTH, 3200 DED-NOT MET, 80/20 COVERAGE, 60V PT/OT/ST, 5000 OOP-NOT MET, [employer]     Current Problem  Problem List Items Addressed This Visit             ICD-10-CM    Cervical strain S16.1XXA       Precautions  Precautions  Precautions Comment: None    Pain  Pain Assessment: 0-10  0-10 (Numeric) Pain Score: 0 - No pain    Subjective  Pt report very little pain over past few weeks. Shoulder still feels a little week but he is getting through daily activities without restriction. He feels he can continue with independent exercise.     Objective  Cervical AROM Range   Flexion Full   Extension Full   R rotation 75 deg   L rotation 75 deg   R sidebend 50 deg   L sidebend 50 deg     UE MMT L R   Shoulder flexion 4+/5 5/5   Shoulder extension 4+/5 5/5   Shoulder abduction 4+/5 5/5   Shoulder ER 5/5 5/5   Shoulder IR 5/5 5/5   Low, mid trap 4+/5 RUE, 4+/5 with no pain    Posture: mild forward head, increased thoracic kyphosis but corrects independently and less pronounced than at eval.     Treatment:  Therapeutic Exercise  Therapeutic Exercise Performed: Yes  UBE L1 x 90\" fwd/bwd      Flexion wall slide x15    Flexion, scaption, abduction to 90 with 3# dumbbell 2x10   Band row 2x10 15#  Band extension 2x10 15#  Band ER 2x10 ea 10#  Band IR 2x10 10#  Band chest press 2x10 10#    Manual Therapy  Manual Therapy Performed: Yes  TpR and STM L upper trap and supraspinatus in supine    Current HEP:  Access Code: [email or web address]  URL: [email or web address]  Date: 09/25/2024  Prepared by: Alonzo" "Milena    Exercises  - Seated Scapular Retraction  - 1 x daily - 7 x weekly - 2 sets - 10 reps - 3-5 seond hold  - Seated Cervical Retraction  - 1 x daily - 7 x weekly - 2 sets - 10 reps - 3-5 seconds hold  - Seated Upper Trapezius Stretch  - 1 x daily - 7 x weekly - 1 sets - 3 reps - 30 second hold  - Corner Pec Minor Stretch  - 1 x daily - 7 x weekly - 1 sets - 3 reps - 30 hold  - Supine Suboccipital Release with Tennis Balls  - 1 x daily - 7 x weekly - 2 sets - 10 reps - 30-60\"  hold    Access Code: [email or web address]  URL: [email or web address]  Date: 10/24/2024  Prepared by: Alonzo Abbott    Exercises  - Wall Push Up with Plus  - 1 x daily - 7 x weekly - 2 sets - 10 reps  - Shoulder External Rotation with Anchored Resistance  - 1 x daily - 7 x weekly - 2 sets - 10 reps  - Standing Shoulder Row with Anchored Resistance  - 1 x daily - 7 x weekly - 2 sets - 10 reps  - Shoulder extension with resistance - Neutral  - 1 x daily - 7 x weekly - 2 sets - 10 reps    Access Code: [email or web address]  URL: [email or web address]  Date: 10/31/2024  Prepared by: Alonzo Abbott    Exercises  - Standing Tricep Extensions with Resistance  - 1 x daily - 7 x weekly - 2 sets - 10 reps  - Standing Bicep Curls with Resistance  - 1 x daily - 7 x weekly - 2 sets - 10 reps  - Standing Shoulder Flexion to 90 Degrees with Dumbbells  - 1 x daily - 7 x weekly - 2 sets - 10 reps  - Scaption with Dumbbells  - 1 x daily - 7 x weekly - 2 sets - 10 reps  - Shoulder Abduction with Dumbbells - Thumbs Up  - 1 x daily - 7 x weekly - 2 sets - 10 reps  - Standing Shoulder Horizontal Abduction with Resistance  - 1 x daily - 7 x weekly - 2 sets - 10 reps  - Shoulder External Rotation and Scapular Retraction with Resistance  - 1 x daily - 7 x weekly - 2 sets - 10 reps    Assessment:  Pt's response to treatment: Pt has completed 10 PT visits to address L neck/shoulder pain. Pt shows excellent improvements in pain, strength, ROM, and posture in that time. Pt is " completing ADLs and work tasks without limitation. At this time pt has met his goals and shows no further need for intervention. POC to be discharged following visit. Pt in agreement with plan.     Pain end of session: 0/10    Plan:  Discharge     Goals:  Resolved       PT Problem       STG, 5 visits (Met)       Start:  09/25/24    Expected End:  11/09/24    Resolved:  11/05/24    Pt will be independent in HEP to improve posture, UE strength, and function.  Pt will demonstrate 50 deg L cervical rotation without pain for decreased pain with driving.  Pt will demonstrate appropriate sitting, standing, and dynamic posture at the beginning and throughout a session with minimal cues for decreased cervical strain during functional tasks.          Pt will demonstrate 60 deg L cervical rotation AROM  without pain for improved cervical mobility during ADLs and driving.  (Met)       Start:  09/25/24    Expected End:  12/24/24    Resolved:  11/05/24         Pt will increase strength in L shoulder by 1/2 MMT in all planes for improved performance of functional activities.  (Met)       Start:  09/25/24    Expected End:  12/24/24    Resolved:  11/05/24         Pt will perform medium weight lifting tasks with appropriate posture and without pain for improved performance of ADLs.   (Met)       Start:  09/25/24    Expected End:  12/24/24    Resolved:  11/05/24         Pt will sit and work at desk for up to 1 hour with appropriate ergonomic setup and without onset of pain.   (Met)       Start:  09/25/24    Expected End:  12/24/24    Resolved:  11/05/24         Pt will demonstrate appropriate sitting, standing, and dynamic posture at the beginning and throughout a session without cues for decreased cervical strain during functional tasks.   (Met)       Start:  09/25/24    Expected End:  12/24/24    Resolved:  11/05/24

## 2024-11-05 ENCOUNTER — TREATMENT (OUTPATIENT)
Dept: PHYSICAL THERAPY | Facility: CLINIC | Age: 59
End: 2024-11-05
Payer: COMMERCIAL

## 2024-11-05 DIAGNOSIS — S16.1XXA STRAIN OF NECK MUSCLE, INITIAL ENCOUNTER: ICD-10-CM

## 2024-11-05 PROCEDURE — 97140 MANUAL THERAPY 1/> REGIONS: CPT | Mod: GP

## 2024-11-05 PROCEDURE — 97110 THERAPEUTIC EXERCISES: CPT | Mod: GP

## 2024-11-05 ASSESSMENT — PAIN - FUNCTIONAL ASSESSMENT: PAIN_FUNCTIONAL_ASSESSMENT: 0-10

## 2024-11-05 ASSESSMENT — PAIN SCALES - GENERAL: PAINLEVEL_OUTOF10: 0 - NO PAIN

## 2024-11-12 ENCOUNTER — APPOINTMENT (OUTPATIENT)
Dept: UROLOGY | Facility: CLINIC | Age: 59
End: 2024-11-12
Payer: COMMERCIAL

## 2024-11-19 ENCOUNTER — APPOINTMENT (OUTPATIENT)
Dept: PRIMARY CARE | Facility: CLINIC | Age: 59
End: 2024-11-19
Payer: COMMERCIAL

## 2024-12-17 ENCOUNTER — APPOINTMENT (OUTPATIENT)
Dept: UROLOGY | Facility: CLINIC | Age: 59
End: 2024-12-17
Payer: COMMERCIAL

## 2024-12-29 DIAGNOSIS — F41.9 ANXIETY: ICD-10-CM

## 2024-12-30 RX ORDER — ESCITALOPRAM OXALATE 10 MG/1
30 TABLET ORAL DAILY
Qty: 270 TABLET | Refills: 3 | Status: SHIPPED | OUTPATIENT
Start: 2024-12-30

## 2025-01-13 ENCOUNTER — APPOINTMENT (OUTPATIENT)
Dept: PRIMARY CARE | Facility: CLINIC | Age: 60
End: 2025-01-13
Payer: COMMERCIAL

## 2025-01-23 ENCOUNTER — OFFICE VISIT (OUTPATIENT)
Dept: PRIMARY CARE | Facility: CLINIC | Age: 60
End: 2025-01-23
Payer: COMMERCIAL

## 2025-01-23 VITALS
OXYGEN SATURATION: 97 % | BODY MASS INDEX: 34.1 KG/M2 | HEART RATE: 87 BPM | SYSTOLIC BLOOD PRESSURE: 124 MMHG | DIASTOLIC BLOOD PRESSURE: 70 MMHG | RESPIRATION RATE: 20 BRPM | HEIGHT: 68 IN | WEIGHT: 225 LBS | TEMPERATURE: 98.6 F

## 2025-01-23 DIAGNOSIS — S39.012A STRAIN OF LUMBAR REGION, INITIAL ENCOUNTER: Primary | ICD-10-CM

## 2025-01-23 DIAGNOSIS — R06.02 SOB (SHORTNESS OF BREATH): ICD-10-CM

## 2025-01-23 DIAGNOSIS — N40.1 BENIGN PROSTATIC HYPERPLASIA WITH NOCTURIA: ICD-10-CM

## 2025-01-23 DIAGNOSIS — R35.1 BENIGN PROSTATIC HYPERPLASIA WITH NOCTURIA: ICD-10-CM

## 2025-01-23 DIAGNOSIS — R51.9 NONINTRACTABLE EPISODIC HEADACHE, UNSPECIFIED HEADACHE TYPE: ICD-10-CM

## 2025-01-23 PROCEDURE — 3074F SYST BP LT 130 MM HG: CPT | Performed by: FAMILY MEDICINE

## 2025-01-23 PROCEDURE — 99214 OFFICE O/P EST MOD 30 MIN: CPT | Performed by: FAMILY MEDICINE

## 2025-01-23 PROCEDURE — 3008F BODY MASS INDEX DOCD: CPT | Performed by: FAMILY MEDICINE

## 2025-01-23 PROCEDURE — 3078F DIAST BP <80 MM HG: CPT | Performed by: FAMILY MEDICINE

## 2025-01-23 RX ORDER — FLUTICASONE FUROATE AND VILANTEROL TRIFENATATE 100; 25 UG/1; UG/1
1 POWDER RESPIRATORY (INHALATION) DAILY
Qty: 3 EACH | Refills: 3 | Status: SHIPPED | OUTPATIENT
Start: 2025-01-23

## 2025-01-23 RX ORDER — TAMSULOSIN HYDROCHLORIDE 0.4 MG/1
0.4 CAPSULE ORAL DAILY
Qty: 90 CAPSULE | Refills: 3 | Status: SHIPPED | OUTPATIENT
Start: 2025-01-23

## 2025-01-23 RX ORDER — CYCLOBENZAPRINE HCL 10 MG
10 TABLET ORAL NIGHTLY PRN
Qty: 30 TABLET | Refills: 0 | Status: SHIPPED | OUTPATIENT
Start: 2025-01-23 | End: 2025-03-24

## 2025-01-23 ASSESSMENT — PATIENT HEALTH QUESTIONNAIRE - PHQ9
SUM OF ALL RESPONSES TO PHQ9 QUESTIONS 1 AND 2: 0
1. LITTLE INTEREST OR PLEASURE IN DOING THINGS: NOT AT ALL
2. FEELING DOWN, DEPRESSED OR HOPELESS: NOT AT ALL

## 2025-01-23 ASSESSMENT — ENCOUNTER SYMPTOMS
OCCASIONAL FEELINGS OF UNSTEADINESS: 0
LOSS OF SENSATION IN FEET: 0
DEPRESSION: 0

## 2025-01-23 ASSESSMENT — PAIN SCALES - GENERAL: PAINLEVEL_OUTOF10: 0-NO PAIN

## 2025-01-23 NOTE — PROGRESS NOTES
"Subjective   Patient ID: Boston Daniel is a 59 y.o. male who presents for Headache (PATIENT COMPLAINS OF A HEADACHE ONLY ON THE LEFT SIDE OF HIS HEAD FOR 2 MONTHS).    HPI left sided frontal HA intermittently for a couple months.  This happens a couple of times a week.  This lasts hours and sometimes sleep helps. Excedrine helps.    Sometimes he has visual changes with ?scotoma/lights.  No visual loss.   Occasional caffiene but not on a regular basis.     Review of Systems    Objective   /70 (BP Location: Right arm, Patient Position: Sitting, BP Cuff Size: Adult)   Pulse 87   Temp 37 °C (98.6 °F) (Skin)   Resp 20   Ht 1.727 m (5' 8\")   Wt 102 kg (225 lb)   SpO2 97%   BMI 34.21 kg/m²     Physical Exam    Assessment/Plan   {Assess/PlanSmartLinks:44969}       "

## 2025-01-28 ENCOUNTER — APPOINTMENT (OUTPATIENT)
Dept: UROLOGY | Facility: CLINIC | Age: 60
End: 2025-01-28
Payer: COMMERCIAL

## 2025-01-28 VITALS — TEMPERATURE: 96 F | HEIGHT: 68 IN | WEIGHT: 225 LBS | BODY MASS INDEX: 34.1 KG/M2

## 2025-01-28 DIAGNOSIS — Z86.79 HISTORY OF VARICOCELE: Primary | ICD-10-CM

## 2025-01-28 DIAGNOSIS — N50.82 SCROTAL PAIN: ICD-10-CM

## 2025-01-28 LAB
POC APPEARANCE, URINE: CLEAR
POC BILIRUBIN, URINE: NEGATIVE
POC BLOOD, URINE: NEGATIVE
POC COLOR, URINE: YELLOW
POC GLUCOSE, URINE: NEGATIVE MG/DL
POC KETONES, URINE: NEGATIVE MG/DL
POC LEUKOCYTES, URINE: NEGATIVE
POC NITRITE,URINE: NEGATIVE
POC PH, URINE: 6 PH
POC PROTEIN, URINE: NEGATIVE MG/DL
POC SPECIFIC GRAVITY, URINE: 1.02
POC UROBILINOGEN, URINE: 0.2 EU/DL

## 2025-01-28 PROCEDURE — 99204 OFFICE O/P NEW MOD 45 MIN: CPT | Performed by: UROLOGY

## 2025-01-28 PROCEDURE — 81003 URINALYSIS AUTO W/O SCOPE: CPT | Performed by: UROLOGY

## 2025-01-28 PROCEDURE — 3008F BODY MASS INDEX DOCD: CPT | Performed by: UROLOGY

## 2025-01-28 ASSESSMENT — PAIN SCALES - GENERAL: PAINLEVEL_OUTOF10: 0-NO PAIN

## 2025-01-28 NOTE — PROGRESS NOTES
"HPI:  59 y.o. yo male patient complains of  scrotal pain. Hx varicocele w/ embolization in 98'. Second opinion.    Primary care, gastroenterology and [facility] notes reviewed    #Scrotal pain   How long has this been going on- ongoing since June  which side/ or both- left side  where is pain located- left side, same side as previous embolization, notes does not have it right now but worsens with physical activity  any noticeable swelling- no 'but feels lump'  what was tried to relieve pain- constant, better in the morning, worsens as day progresses, activity worsens  History of UTI/ STD exposure- prostatitis twice in 40s  History of vasectomy-  no  -denies f/c/n/v  -Tumor markers done which were negative    Lab Results   Component Value Date    PSA 1.60 06/18/2024     No components found for: \"CBC\"  Lab Results   Component Value Date    HGBA1C 5.5 09/26/2022     No results found for the last 90 days.    US SCROTUM WITH DOPPLERS, 9/26/24,  - Impression -  Bilateral varicoceles.   Otherwise unremarkable sonographic appearance of the scrotal contents.   Normal arterial and venous flow within both testes.     Scrotal US, 7/10/24,   IMPRESSION:  Testicles and epididymi are within normal limits. Normal flow bilaterally.    Minimal-mild bilateral varicoceles.  PMH:  Past Medical History:   Diagnosis Date    Chest pain 11/30/2021    Malaise and fatigue 01/06/2021        PSH:  Past Surgical History:   Procedure Laterality Date    MR HEAD ANGIO WO IV CONTRAST  2/14/2023    MR HEAD ANGIO WO IV CONTRAST [facility]    OTHER SURGICAL HISTORY  06/10/2021    Appendectomy    OTHER SURGICAL HISTORY  06/10/2021    Tonsillectomy        Medications:    Current Outpatient Medications:     albuterol 90 mcg/actuation inhaler, Inhale 2 puffs every 4 hours if needed for wheezing or shortness of breath., Disp: , Rfl:     aspirin 81 mg chewable tablet, Chew 1 tablet (81 mg) once daily., Disp: , Rfl:     atorvastatin (Lipitor) 10 mg " tablet, Take 1 tablet by mouth once daily, Disp: 90 tablet, Rfl: 3    Breo Ellipta 100-25 mcg/dose inhaler, Inhale 1 puff once daily., Disp: 3 each, Rfl: 3    cholecalciferol (Vitamin D-3) 5,000 Units tablet, Take 1 tablet (5,000 Units) by mouth once every 24 hours., Disp: , Rfl:     Cinnamon 500 mg capsule, Take 1 capsule (500 mg) by mouth once daily., Disp: , Rfl:     cyanocobalamin, vitamin B-12, 1,000 mcg/mL drops, Take 1 drop by mouth every other day., Disp: , Rfl:     cyclobenzaprine (Flexeril) 10 mg tablet, Take 1 tablet (10 mg) by mouth as needed at bedtime for muscle spasms., Disp: 30 tablet, Rfl: 0    escitalopram (Lexapro) 10 mg tablet, Take 3 tablets by mouth once daily, Disp: 270 tablet, Rfl: 3    ibuprofen (Motrin) capsule, Take 1 capsule (200 mg) by mouth if needed., Disp: , Rfl:     mv-min-folic-K1-lycopen-lutein (Centrum Silver Ultra Men's) 959--300 mcg tablet, Take 1 tablet by mouth once daily., Disp: , Rfl:     ondansetron (Zofran) 4 mg tablet, Take 2 tablets (8 mg) by mouth every 12 hours if needed for nausea or vomiting., Disp: , Rfl:     pantoprazole (ProtoNix) 40 mg EC tablet, Take 1 tablet (40 mg) by mouth once daily in the morning. Take before meals., Disp: , Rfl:     propranolol (Inderal) 10 mg tablet, Take 0.5-1 tablets (5-10 mg) by mouth 3 times a day as needed (FOR TREMOR)., Disp: , Rfl:     tamsulosin (Flomax) 0.4 mg 24 hr capsule, Take 1 capsule (0.4 mg) by mouth once daily., Disp: 90 capsule, Rfl: 3    Allergy:  Allergies   Allergen Reactions    Grass Pollen Unknown and Rash        Exam  Testicles descended bilaterally, nontender, no masses  Vasa palpable bilaterally  Varicocele: left Grade 2 varicocele   Penis circ'd, no lesions, no plaques    Assessment/Plan  #scrotal pain - left side, hx of varicocele embolization  Discussed scrotal pain and its different etiologies, including epididymitis, cancer, orchitis, etc.  Discussed workup for epididymo-orchitis could include US,  urine culture, and GC/CT  Discussed that even if he has a varicocele, there is no guarantee that this pain is from varicocele and that varicocele repair will alleviate the pain  Discussed cord block and microsurgical cord denervation in detail, including risks benefits and alternatives  Discussed conservative measures like nsaids, elevation, ice, tight underwear etc.  Also discussed pelvic floor PT  Warning signs reviewed    Patient elects   Repeat scrotal US in March (6 months from the last US dated 9/26/24)  Pelvic floor PT    #testicular lesion  -this was resolved on previous images as mentioned by patient's wife who is an ultrasound tech  -will repeat scrotal US to evaluate  -also mentioned bringing in discs of previous imaging done outside  at next visit     Follow up after    Scribe Attestation  By signing my name below, I, Gloria Seay Scribdelio   attest that this documentation has been prepared under the direction and in the presence of Codi Huerta MD.

## 2025-01-29 ENCOUNTER — APPOINTMENT (OUTPATIENT)
Dept: PHARMACY | Facility: HOSPITAL | Age: 60
End: 2025-01-29
Payer: COMMERCIAL

## 2025-01-29 DIAGNOSIS — R51.9 NONINTRACTABLE EPISODIC HEADACHE, UNSPECIFIED HEADACHE TYPE: ICD-10-CM

## 2025-01-29 PROCEDURE — RXMED WILLOW AMBULATORY MEDICATION CHARGE

## 2025-01-29 RX ORDER — FAMOTIDINE 40 MG/1
1 TABLET, FILM COATED ORAL NIGHTLY
COMMUNITY
Start: 2024-12-30

## 2025-01-29 NOTE — PROGRESS NOTES
"Boston Daniel \"Boston Daniel\" is a 59 y.o. male referred to Clinical Pharmacy Team for migraine treatment.  Referring Provider: Alonzo Charles DO    MEDICATION INITIATION ASSESSMENT    PERTINENT PMH: [HTN, CVD/CVA, pregnancy]  - Monthly migraine days: 5-6x  - Symptoms: light sensitivity  - Duration: couple hours  - Neurologist: n/a    HISTORICAL PHARMACOTHERAPY  - Triptans - extreme fatigue; not effective    ADHERENCE/AFFORDABILITY  - no issues with affordability  - PA needed    LAB REVIEW  Glucose   Date Value   07/10/2024 98 mg/dL   05/10/2023 88 MG/DL   09/26/2022 101 MG/DL (H)   05/17/2021 101 MG/DL (H)     Hemoglobin A1C (%)   Date Value   09/26/2022 5.5   11/30/2021 5.5     Bicarbonate   Date Value   07/10/2024 28 mmol/L   05/10/2023 26 MMOL/L   09/26/2022 25 MMOL/L   05/17/2021 24 MMOL/L     Urea Nitrogen   Date Value   07/10/2024 13 mg/dL   05/10/2023 11 MG/DL   09/26/2022 12 MG/DL   05/17/2021 14 MG/DL     Creatinine   Date Value   07/10/2024 0.90 mg/dL   05/10/2023 0.9 MG/DL   09/26/2022 0.9 MG/DL   05/17/2021 1.0 MG/DL     Lab Results   Component Value Date    CHOL 155 09/26/2022    CHOL 209 (H) 05/17/2021    CHOL 232 (H) 01/09/2020     Lab Results   Component Value Date    HDL 34 (L) 09/26/2022    HDL 37 (L) 05/17/2021    HDL 39 (L) 01/09/2020     Lab Results   Component Value Date    LDLCALC 72 09/26/2022    LDLCALC 129 05/17/2021    LDLCALC 152 (H) 01/09/2020     Lab Results   Component Value Date    TRIG 243 (H) 09/26/2022    TRIG 214 (H) 05/17/2021    TRIG 206 (H) 01/09/2020       PLAN  START Nurtec 75mg every other day x1 month, then once daily PRN  Prescription sent to [facility] pharmacy for assistance on authorization and copay.    Patient received the following medication education for Nurtec:    Used as acute treatment and prevention for migraines  Dosing of 75mg every other day for prevention   Advised can administer one dose of 75mg per 24 hours period if using for treatment of acute " migraine  Can be given with or without food and should be stored at room temperature  To administer: peel foil pack with dry hands and place tablet on top of or under tongue, letting it dissolve then swallowing  Common side effects of nausea, dyspepsia and abdominal pain    Medication education provided by ROSALVA Small, PharmD, BCPS    Continue all meds under the continuation of care with the referring provider and clinical pharmacy team.  Verbal consent to manage patient's drug therapy was obtained.They were informed they may decline to participate or withdraw from participation in pharmacy services at any time.

## 2025-02-08 ENCOUNTER — PHARMACY VISIT (OUTPATIENT)
Dept: PHARMACY | Facility: CLINIC | Age: 60
End: 2025-02-08
Payer: COMMERCIAL

## 2025-03-14 ENCOUNTER — PHARMACY VISIT (OUTPATIENT)
Dept: PHARMACY | Facility: CLINIC | Age: 60
End: 2025-03-14
Payer: COMMERCIAL

## 2025-03-14 PROCEDURE — RXMED WILLOW AMBULATORY MEDICATION CHARGE

## 2025-03-17 ENCOUNTER — TELEPHONE (OUTPATIENT)
Dept: PHARMACY | Facility: HOSPITAL | Age: 60
End: 2025-03-17
Payer: COMMERCIAL

## 2025-03-17 NOTE — TELEPHONE ENCOUNTER
Patient left VM regarding issues with Breo and Lexapro prescriptions. Attempted to reach patient to discuss, had to leave VM.

## 2025-03-19 DIAGNOSIS — R06.02 SOB (SHORTNESS OF BREATH): ICD-10-CM

## 2025-03-19 DIAGNOSIS — F32.9 MAJOR DEPRESSIVE DISORDER WITH SINGLE EPISODE, REMISSION STATUS UNSPECIFIED: Primary | ICD-10-CM

## 2025-03-19 RX ORDER — FLUTICASONE FUROATE AND VILANTEROL TRIFENATATE 100; 25 UG/1; UG/1
1 POWDER RESPIRATORY (INHALATION) DAILY
Qty: 180 EACH | Refills: 3 | Status: SHIPPED | OUTPATIENT
Start: 2025-03-19

## 2025-03-22 ENCOUNTER — PHARMACY VISIT (OUTPATIENT)
Dept: PHARMACY | Facility: CLINIC | Age: 60
End: 2025-03-22
Payer: COMMERCIAL

## 2025-03-22 PROCEDURE — RXMED WILLOW AMBULATORY MEDICATION CHARGE

## 2025-03-24 ENCOUNTER — OFFICE VISIT (OUTPATIENT)
Dept: PRIMARY CARE | Facility: CLINIC | Age: 60
End: 2025-03-24
Payer: COMMERCIAL

## 2025-03-24 VITALS
HEART RATE: 89 BPM | OXYGEN SATURATION: 98 % | BODY MASS INDEX: 34.86 KG/M2 | TEMPERATURE: 96.1 F | WEIGHT: 230 LBS | RESPIRATION RATE: 16 BRPM | SYSTOLIC BLOOD PRESSURE: 120 MMHG | HEIGHT: 68 IN | DIASTOLIC BLOOD PRESSURE: 76 MMHG

## 2025-03-24 DIAGNOSIS — F32.5 MAJOR DEPRESSIVE DISORDER WITH SINGLE EPISODE, IN FULL REMISSION (CMS-HCC): Primary | ICD-10-CM

## 2025-03-24 DIAGNOSIS — I10 ESSENTIAL HYPERTENSION: ICD-10-CM

## 2025-03-24 DIAGNOSIS — Z01.84 ANTIBODY RESPONSE EXAM: ICD-10-CM

## 2025-03-24 PROCEDURE — 3078F DIAST BP <80 MM HG: CPT | Performed by: FAMILY MEDICINE

## 2025-03-24 PROCEDURE — 3074F SYST BP LT 130 MM HG: CPT | Performed by: FAMILY MEDICINE

## 2025-03-24 PROCEDURE — 99214 OFFICE O/P EST MOD 30 MIN: CPT | Performed by: FAMILY MEDICINE

## 2025-03-24 PROCEDURE — 3008F BODY MASS INDEX DOCD: CPT | Performed by: FAMILY MEDICINE

## 2025-03-24 ASSESSMENT — PATIENT HEALTH QUESTIONNAIRE - PHQ9
2. FEELING DOWN, DEPRESSED OR HOPELESS: NOT AT ALL
1. LITTLE INTEREST OR PLEASURE IN DOING THINGS: NOT AT ALL
SUM OF ALL RESPONSES TO PHQ9 QUESTIONS 1 AND 2: 0

## 2025-03-24 ASSESSMENT — COLUMBIA-SUICIDE SEVERITY RATING SCALE - C-SSRS
1. IN THE PAST MONTH, HAVE YOU WISHED YOU WERE DEAD OR WISHED YOU COULD GO TO SLEEP AND NOT WAKE UP?: NO
6. HAVE YOU EVER DONE ANYTHING, STARTED TO DO ANYTHING, OR PREPARED TO DO ANYTHING TO END YOUR LIFE?: NO
2. HAVE YOU ACTUALLY HAD ANY THOUGHTS OF KILLING YOURSELF?: NO

## 2025-03-24 ASSESSMENT — ENCOUNTER SYMPTOMS
LOSS OF SENSATION IN FEET: 0
DEPRESSION: 0
OCCASIONAL FEELINGS OF UNSTEADINESS: 0

## 2025-03-24 ASSESSMENT — PAIN SCALES - GENERAL: PAINLEVEL_OUTOF10: 0-NO PAIN

## 2025-03-25 LAB
MEV IGG SER IA-ACNC: 269 AU/ML
MUV IGG SER IA-ACNC: 44.1 AU/ML
RUBV IGG SERPL IA-ACNC: 3.52 INDEX

## 2025-03-25 RX ORDER — ESCITALOPRAM OXALATE 10 MG/1
10 TABLET ORAL DAILY
Qty: 30 TABLET | Refills: 5 | Status: SHIPPED | OUTPATIENT
Start: 2025-03-25 | End: 2025-09-21

## 2025-03-25 RX ORDER — ESCITALOPRAM OXALATE 20 MG/1
20 TABLET ORAL DAILY
Qty: 30 TABLET | Refills: 5 | Status: SHIPPED | OUTPATIENT
Start: 2025-03-25 | End: 2025-09-21

## 2025-03-30 ASSESSMENT — ENCOUNTER SYMPTOMS
NEUROLOGICAL NEGATIVE: 1
RESPIRATORY NEGATIVE: 1
GASTROINTESTINAL NEGATIVE: 1
CONSTITUTIONAL NEGATIVE: 1
MUSCULOSKELETAL NEGATIVE: 1
CARDIOVASCULAR NEGATIVE: 1

## 2025-04-07 ENCOUNTER — HOSPITAL ENCOUNTER (OUTPATIENT)
Dept: RADIOLOGY | Facility: CLINIC | Age: 60
Discharge: HOME | End: 2025-04-07
Payer: COMMERCIAL

## 2025-04-07 DIAGNOSIS — Z86.79 HISTORY OF VARICOCELE: ICD-10-CM

## 2025-04-07 PROCEDURE — 76870 US EXAM SCROTUM: CPT | Performed by: RADIOLOGY

## 2025-04-07 PROCEDURE — 76870 US EXAM SCROTUM: CPT

## 2025-04-24 PROCEDURE — RXMED WILLOW AMBULATORY MEDICATION CHARGE

## 2025-04-25 ENCOUNTER — PHARMACY VISIT (OUTPATIENT)
Dept: PHARMACY | Facility: CLINIC | Age: 60
End: 2025-04-25
Payer: COMMERCIAL

## 2025-04-29 ENCOUNTER — APPOINTMENT (OUTPATIENT)
Dept: UROLOGY | Facility: CLINIC | Age: 60
End: 2025-04-29
Payer: COMMERCIAL

## 2025-04-29 DIAGNOSIS — N50.819 PAIN IN TESTICLE, UNSPECIFIED LATERALITY: ICD-10-CM

## 2025-04-29 DIAGNOSIS — N50.811 PAIN IN BOTH TESTICLES: Primary | ICD-10-CM

## 2025-04-29 DIAGNOSIS — N50.9 TESTICULAR LESION: ICD-10-CM

## 2025-04-29 DIAGNOSIS — Z12.5 PROSTATE CANCER SCREENING: ICD-10-CM

## 2025-04-29 DIAGNOSIS — N50.812 PAIN IN BOTH TESTICLES: Primary | ICD-10-CM

## 2025-04-29 DIAGNOSIS — R79.89 LOW TESTOSTERONE: ICD-10-CM

## 2025-04-29 PROCEDURE — G2211 COMPLEX E/M VISIT ADD ON: HCPCS | Performed by: UROLOGY

## 2025-04-29 PROCEDURE — 99215 OFFICE O/P EST HI 40 MIN: CPT | Performed by: UROLOGY

## 2025-04-29 NOTE — PROGRESS NOTES
HPI:  60 y.o. yo male patient complains of  scrotal pain. Hx varicocele w/ embolization in 98'. Second opinion.    Last Visit: 1/28/25  Repeat scrotal US in March (6 months from the last US dated 9/26/24)  Pelvic floor PT  #testicular lesion  -this was resolved on previous images as mentioned by patient's wife who is an ultrasound tech  -will repeat scrotal US to evaluate  -also mentioned bringing in discs of previous imaging done outside  at next visit     Today's Visit     #Scrotal pain   How long has this been going on- ongoing since June, intermittent   which side/ or both- left side  where is pain located- left side, same side as previous embolization, notes does not have it right now but worsens with physical activity  any noticeable swelling- no 'but feels lump'  what was tried to relieve pain- constant, better in the morning, worsens as day progresses, activity worsens  History of UTI/ STD exposure- prostatitis twice in 40s  History of vasectomy-  no  -denies f/c/n/v  -Tumor markers done which were negative  -Not done pelvic floor PT    #low T  Patient mentions he was on Clomid for low T since 2011 and stopped 1.5 years ago  -interested in T therapy  -all previous T levels (3) reviewed    Want to preserve fertility: No  Personal history of gynecomastia and/or concerns about the condition: MNo  Family / Personal History of Prostate Cancer: No  MI or Stroke: No    Scrotal US, 4/7/25, personally reviewed/interpreted:  IMPRESSION:  1. No sonographic evidence of acute scrotal abnormality.    2. Ill-defined 3-4 mm hypoechoic area in the mid left testis and additional ill-defined geographic hypoechoic area in the posterolateral left testis are stable since June 2024 and are nonspecific, possibly related to prior insult (traumatic,inflammatory or ischemic). This may be reassessed in 6-12 months to ensure continued long-term stability.   3. Bilateral varicoceles, greater on the left.  Lab Results   Component Value  "Date    PSA 1.60 06/18/2024     No components found for: \"CBC\"  Lab Results   Component Value Date    HGBA1C 5.5 09/26/2022       US SCROTUM WITH DOPPLERS, 9/26/24,  - Impression -  Bilateral varicoceles.   Otherwise unremarkable sonographic appearance of the scrotal contents.   Normal arterial and venous flow within both testes.     Scrotal US, 7/10/24,   IMPRESSION:  Testicles and epididymi are within normal limits. Normal flow bilaterally.    Minimal-mild bilateral varicoceles.  PMH:  Past Medical History:   Diagnosis Date    Chest pain 11/30/2021    Malaise and fatigue 01/06/2021        PSH:  Past Surgical History:   Procedure Laterality Date    MR HEAD ANGIO WO IV CONTRAST  2/14/2023    MR HEAD ANGIO WO IV CONTRAST [facility]    OTHER SURGICAL HISTORY  06/10/2021    Appendectomy    OTHER SURGICAL HISTORY  06/10/2021    Tonsillectomy        Medications:    Current Outpatient Medications:     albuterol 90 mcg/actuation inhaler, Inhale 2 puffs every 4 hours if needed for wheezing or shortness of breath., Disp: , Rfl:     aspirin 81 mg chewable tablet, Chew 1 tablet (81 mg) once daily., Disp: , Rfl:     atorvastatin (Lipitor) 10 mg tablet, Take 1 tablet by mouth once daily, Disp: 90 tablet, Rfl: 3    Breo Ellipta 100-25 mcg/dose inhaler, Inhale 1 puff by mouth once daily., Disp: 180 each, Rfl: 3    cholecalciferol (Vitamin D-3) 5,000 Units tablet, Take 1 tablet (5,000 Units) by mouth once every 24 hours., Disp: , Rfl:     Cinnamon 500 mg capsule, Take 1 capsule (500 mg) by mouth once daily., Disp: , Rfl:     cyclobenzaprine (Flexeril) 10 mg tablet, Take 1 tablet (10 mg) by mouth as needed at bedtime for muscle spasms., Disp: 30 tablet, Rfl: 0    escitalopram (Lexapro) 10 mg tablet, Take 1 tablet (10 mg) by mouth once daily., Disp: 30 tablet, Rfl: 5    escitalopram (Lexapro) 20 mg tablet, Take 1 tablet (20 mg) by mouth once daily., Disp: 30 tablet, Rfl: 5    famotidine (Pepcid) 40 mg tablet, Take 1 tablet " (40 mg) by mouth once daily at bedtime., Disp: , Rfl:     ibuprofen (Motrin) capsule, Take 1 capsule (200 mg) by mouth if needed., Disp: , Rfl:     mv-min-folic-K1-lycopen-lutein (Centrum Silver Ultra Men's) 024--300 mcg tablet, Take 1 tablet by mouth once daily., Disp: , Rfl:     ondansetron (Zofran) 4 mg tablet, Take 2 tablets (8 mg) by mouth every 12 hours if needed for nausea or vomiting., Disp: , Rfl:     pantoprazole (ProtoNix) 40 mg EC tablet, Take 1 tablet (40 mg) by mouth once daily in the morning. Take before meals., Disp: , Rfl:     propranolol (Inderal) 10 mg tablet, Take 0.5-1 tablets (5-10 mg) by mouth 3 times a day as needed (FOR TREMOR)., Disp: , Rfl:     rimegepant (Nurtec ODT) 75 mg tablet,disintegrating, Dissolve 1 tablet (75 mg) in the mouth once daily as needed (Migraine)., Disp: 8 tablet, Rfl: 5    tamsulosin (Flomax) 0.4 mg 24 hr capsule, Take 1 capsule (0.4 mg) by mouth once daily., Disp: 90 capsule, Rfl: 3    Allergy:  Allergies   Allergen Reactions    Grass Pollen Unknown and Rash        Exam  Testicles descended bilaterally, nontender, no masses  Vasa palpable bilaterally  Varicocele: left Grade 2 varicocele   Penis circ'd, no lesions, no plaques    Assessment/Plan  #scrotal pain - left side, hx of varicocele embolization  Reviewed scrotal US results  Discussed scrotal pain and its different etiologies, including epididymitis, cancer, orchitis, etc.  Discussed workup for epididymo-orchitis could include US, urine culture, and GC/CT  Discussed that even if he has a varicocele, there is no guarantee that this pain is from varicocele and that varicocele repair will alleviate the pain  Discussed cord block and microsurgical cord denervation in detail, including risks benefits and alternatives  Discussed conservative measures like nsaids, elevation, ice, tight underwear etc.  Also discussed pelvic floor PT  Patient elects   Pelvic floor PT  Repeat scrotal US in 6 months  Reviewed  treatment options again, specifically surgical options    #testicular lesion  -repeat scrotal US in 6 months    I reviewed the common causes of hypogonadism with the patient. We will obtain a fasting, morning hypogonadal panel to determine if his hormonal axis is abnormal. We will evaluate his lab results and if needed, at that point we will begin treatment.     We discussed different modalities to treat hypogonadism, including hcg, anastrozole, clomiphene, testosterone gels/creams, nasal testosterone, testosterone pellets, and testosterone injections. I also reviewed with him common risks following testosterone replacement, including cholesterol imbalance, polycythemia, cancer progression and infertility.  He understands these risks and wishes to proceed. The patient is to adhere to a strict followup either every 3-4 months or biannually.  We reviewed with him with our office's consent form.       Hypogonadism  fasting hypogonadal panel and repeat total T  Was on clomid and gels, never tried injections  MDM: parenteral controlled substance    Screening for polycythemia  Hct  prn phlebotomy for Hct>54    Screening for hyperestrogenemia  E2   arimidex if E is elevated    Prostate ca screening  psa    Fertility preservation  Not interested    Fu after bloodwork      G 2211  Visit complexity inherent to evaluation and management (E&M) associated with medical care services that serve as the continuing focal point for all needed health care services and/or with medical care services that are part of ongoing care related to a patient's single, serious condition or a complex condition.    I have personally reviewed the OARRS report for this patient I have considered the risks of abuse, dependence, addiction and diversion. I believe that it is clinically appropriate for him to be prescribed this medication.”        Scribe Attestation  By signing my name below, IGloria Scribe   attest that this documentation has  been prepared under the direction and in the presence of Codi Huerta MD.

## 2025-05-09 LAB
HCT VFR BLD AUTO: 43.2 % (ref 38.5–50)
LH SERPL-ACNC: 1.7 MIU/ML (ref 1.6–15.2)
PROLACTIN SERPL-MCNC: 9.3 NG/ML (ref 2–18)
PSA SERPL-MCNC: 1.7 NG/ML
TESTOST FREE SERPL-MCNC: NORMAL PG/ML
TESTOST SERPL-MCNC: NORMAL NG/DL

## 2025-05-12 ENCOUNTER — OFFICE VISIT (OUTPATIENT)
Dept: PRIMARY CARE | Facility: CLINIC | Age: 60
End: 2025-05-12
Payer: COMMERCIAL

## 2025-05-12 VITALS
HEIGHT: 68 IN | WEIGHT: 230 LBS | HEART RATE: 83 BPM | TEMPERATURE: 97.5 F | OXYGEN SATURATION: 98 % | SYSTOLIC BLOOD PRESSURE: 120 MMHG | DIASTOLIC BLOOD PRESSURE: 82 MMHG | RESPIRATION RATE: 16 BRPM | BODY MASS INDEX: 34.86 KG/M2

## 2025-05-12 DIAGNOSIS — R53.83 OTHER FATIGUE: Primary | ICD-10-CM

## 2025-05-12 DIAGNOSIS — B02.9 HERPES ZOSTER WITHOUT COMPLICATION: ICD-10-CM

## 2025-05-12 DIAGNOSIS — L03.811 CELLULITIS OF HEAD EXCEPT FACE: ICD-10-CM

## 2025-05-12 PROCEDURE — 3079F DIAST BP 80-89 MM HG: CPT | Performed by: FAMILY MEDICINE

## 2025-05-12 PROCEDURE — 3074F SYST BP LT 130 MM HG: CPT | Performed by: FAMILY MEDICINE

## 2025-05-12 PROCEDURE — 99213 OFFICE O/P EST LOW 20 MIN: CPT | Performed by: FAMILY MEDICINE

## 2025-05-12 PROCEDURE — 3008F BODY MASS INDEX DOCD: CPT | Performed by: FAMILY MEDICINE

## 2025-05-12 RX ORDER — CEPHALEXIN 500 MG/1
500 CAPSULE ORAL 2 TIMES DAILY
Qty: 14 CAPSULE | Refills: 0 | Status: SHIPPED | OUTPATIENT
Start: 2025-05-12 | End: 2025-05-19

## 2025-05-12 ASSESSMENT — ENCOUNTER SYMPTOMS
RESPIRATORY NEGATIVE: 1
NEUROLOGICAL NEGATIVE: 1
MUSCULOSKELETAL NEGATIVE: 1
GASTROINTESTINAL NEGATIVE: 1
CONSTITUTIONAL NEGATIVE: 1
CARDIOVASCULAR NEGATIVE: 1

## 2025-05-12 ASSESSMENT — PAIN SCALES - GENERAL: PAINLEVEL_OUTOF10: 0-NO PAIN

## 2025-05-12 NOTE — PROGRESS NOTES
"Subjective   Patient ID: Boston Daniel is a 60 y.o. male who presents for Sick Visit (Pt is here with complaints of headaches with nausea, constipation and always cold. Pt also reports brain fog and joint pain.).    HPI still getting HA a couple times a week but does not feel like his normal migraine.    Some nausea and constipation.      Review of Systems   Constitutional: Negative.    HENT: Negative.     Respiratory: Negative.     Cardiovascular: Negative.    Gastrointestinal: Negative.    Genitourinary: Negative.    Musculoskeletal: Negative.    Neurological: Negative.        Objective   /82   Pulse 83   Temp 36.4 °C (97.5 °F) (Temporal)   Resp 16   Ht 1.727 m (5' 8\")   Wt 104 kg (230 lb)   SpO2 98%   BMI 34.97 kg/m²     Physical Exam  Constitutional:       General: He is not in acute distress.     Appearance: Normal appearance.   Cardiovascular:      Rate and Rhythm: Normal rate and regular rhythm.      Heart sounds: No murmur heard.  Pulmonary:      Breath sounds: Normal breath sounds. No wheezing.   Skin:     Comments: Left posterior scalp at neckline with small patch of clustered vesicles.  No weeping.    Neurological:      Mental Status: He is alert.         Assessment/Plan   Problem List Items Addressed This Visit    None  Visit Diagnoses         Codes      Other fatigue    -  Primary R53.83    Relevant Orders    Vitamin B12    CBC and Auto Differential    TSH with reflex to Free T4 if abnormal    Iron and TIBC    Comprehensive metabolic panel      Herpes zoster without complication     B02.9      Cellulitis of head except face     L03.811    Relevant Medications    cephalexin (Keflex) 500 mg capsule        ? Shingles vs folliculitis vs tinea.  It does appear to have vesicular clusters but is too late for antivirals.  Will give abx to cover for cellulitis and monitor x 7-10 days.  Follow up if not resolved.  Will recheck additional labs and await testosterone labs.  He has been off of his b 12 " for a year now.

## 2025-05-13 LAB
ALBUMIN SERPL-MCNC: 4 G/DL (ref 3.6–5.1)
ALP SERPL-CCNC: 83 U/L (ref 35–144)
ALT SERPL-CCNC: 16 U/L (ref 9–46)
ANION GAP SERPL CALCULATED.4IONS-SCNC: 9 MMOL/L (CALC) (ref 7–17)
AST SERPL-CCNC: 17 U/L (ref 10–35)
BASOPHILS # BLD AUTO: 56 CELLS/UL (ref 0–200)
BASOPHILS NFR BLD AUTO: 0.6 %
BILIRUB SERPL-MCNC: 0.7 MG/DL (ref 0.2–1.2)
BUN SERPL-MCNC: 18 MG/DL (ref 7–25)
CALCIUM SERPL-MCNC: 9.3 MG/DL (ref 8.6–10.3)
CHLORIDE SERPL-SCNC: 105 MMOL/L (ref 98–110)
CO2 SERPL-SCNC: 25 MMOL/L (ref 20–32)
CREAT SERPL-MCNC: 0.8 MG/DL (ref 0.7–1.35)
EGFRCR SERPLBLD CKD-EPI 2021: 101 ML/MIN/1.73M2
EOSINOPHIL # BLD AUTO: 141 CELLS/UL (ref 15–500)
EOSINOPHIL NFR BLD AUTO: 1.5 %
ERYTHROCYTE [DISTWIDTH] IN BLOOD BY AUTOMATED COUNT: 13.4 % (ref 11–15)
GLUCOSE SERPL-MCNC: 102 MG/DL (ref 65–99)
HCT VFR BLD AUTO: 42.3 % (ref 38.5–50)
HGB BLD-MCNC: 13.8 G/DL (ref 13.2–17.1)
IRON SATN MFR SERPL: 26 % (CALC) (ref 20–48)
IRON SERPL-MCNC: 88 MCG/DL (ref 50–180)
LYMPHOCYTES # BLD AUTO: 1927 CELLS/UL (ref 850–3900)
LYMPHOCYTES NFR BLD AUTO: 20.5 %
MCH RBC QN AUTO: 30.9 PG (ref 27–33)
MCHC RBC AUTO-ENTMCNC: 32.6 G/DL (ref 32–36)
MCV RBC AUTO: 94.6 FL (ref 80–100)
MONOCYTES # BLD AUTO: 931 CELLS/UL (ref 200–950)
MONOCYTES NFR BLD AUTO: 9.9 %
NEUTROPHILS # BLD AUTO: 6345 CELLS/UL (ref 1500–7800)
NEUTROPHILS NFR BLD AUTO: 67.5 %
PLATELET # BLD AUTO: 177 THOUSAND/UL (ref 140–400)
PMV BLD REES-ECKER: 13.1 FL (ref 7.5–12.5)
POTASSIUM SERPL-SCNC: 4.1 MMOL/L (ref 3.5–5.3)
PROT SERPL-MCNC: 6.4 G/DL (ref 6.1–8.1)
RBC # BLD AUTO: 4.47 MILLION/UL (ref 4.2–5.8)
SODIUM SERPL-SCNC: 139 MMOL/L (ref 135–146)
TIBC SERPL-MCNC: 338 MCG/DL (CALC) (ref 250–425)
TSH SERPL-ACNC: 0.88 MIU/L (ref 0.4–4.5)
VIT B12 SERPL-MCNC: 415 PG/ML (ref 200–1100)
WBC # BLD AUTO: 9.4 THOUSAND/UL (ref 3.8–10.8)

## 2025-05-15 LAB
HCT VFR BLD AUTO: 44 % (ref 38.5–50)
PROLACTIN SERPL-MCNC: 9 NG/ML (ref 2–18)
PSA SERPL-MCNC: 2.05 NG/ML

## 2025-05-16 DIAGNOSIS — B02.9 HERPES ZOSTER WITHOUT COMPLICATION: Primary | ICD-10-CM

## 2025-05-16 LAB
HCT VFR BLD AUTO: 43.2 % (ref 38.5–50)
LH SERPL-ACNC: 1.7 MIU/ML (ref 1.6–15.2)
PROLACTIN SERPL-MCNC: 9.3 NG/ML (ref 2–18)
PSA SERPL-MCNC: 1.7 NG/ML
TESTOST FREE SERPL-MCNC: 40.9 PG/ML (ref 35–155)
TESTOST SERPL-MCNC: 186 NG/DL (ref 250–1100)

## 2025-05-16 RX ORDER — VALACYCLOVIR HYDROCHLORIDE 1 G/1
1000 TABLET, FILM COATED ORAL 3 TIMES DAILY
Qty: 21 TABLET | Refills: 0 | Status: SHIPPED | OUTPATIENT
Start: 2025-05-16 | End: 2025-05-23

## 2025-05-30 ENCOUNTER — APPOINTMENT (OUTPATIENT)
Dept: UROLOGY | Facility: CLINIC | Age: 60
End: 2025-05-30
Payer: COMMERCIAL

## 2025-06-24 ENCOUNTER — APPOINTMENT (OUTPATIENT)
Dept: UROLOGY | Facility: CLINIC | Age: 60
End: 2025-06-24
Payer: COMMERCIAL

## 2025-06-24 DIAGNOSIS — R79.89 LOW TESTOSTERONE: Primary | ICD-10-CM

## 2025-06-24 DIAGNOSIS — N50.9 SCROTAL LESION: ICD-10-CM

## 2025-06-24 PROCEDURE — 99214 OFFICE O/P EST MOD 30 MIN: CPT | Performed by: UROLOGY

## 2025-06-24 NOTE — PROGRESS NOTES
"HPI:  60 y.o. yo male patient complains of  scrotal pain. Hx varicocele w/ embolization in 98'. Second opinion.    Last Visit: 4/29/25  #scrotal pain - left side, hx of varicocele embolization  -Discussed conservative measures like nsaids, elevation, ice, tight underwear etc.  -Pelvic floor PT  -Repeat scrotal US in 6 months    #testicular lesion  -repeat scrotal US in 6 months    #Hypogonadism  -fasting hypogonadal panel and repeat total T  -Was on clomid and gels, never tried injections    Today's Visit   #Low Testosterone  -last Testosterone 199  -interested in T therapy  -labs reviewed. Wife is concerned about low LH.    -pt also reports vision changes    Want to preserve fertility: No  Personal history of gynecomastia and/or concerns about the condition: MNo  Family / Personal History of Prostate Cancer: yes, father  MI or Stroke: No    Scrotal US, 4/7/25, personally reviewed/interpreted:  IMPRESSION:  1. No sonographic evidence of acute scrotal abnormality.    2. Ill-defined 3-4 mm hypoechoic area in the mid left testis and additional ill-defined geographic hypoechoic area in the posterolateral left testis are stable since June 2024 and are nonspecific, possibly related to prior insult (traumatic,inflammatory or ischemic). This may be reassessed in 6-12 months to ensure continued long-term stability.   3. Bilateral varicoceles, greater on the left.    Lab Results   Component Value Date    TESTOSTERONE 199 (L) 06/02/2025    TESTOSTERONE 186 (L) 05/08/2025    TESTOSTERONE 640 05/25/2023     Lab Results   Component Value Date    LH 1.7 05/08/2025     No results found for: \"FSH\"  Lab Results   Component Value Date    PSA 2.05 05/15/2025     Lab Results   Component Value Date    PROLACTIN 9.0 05/15/2025     Lab Results   Component Value Date    CREATININE 0.80 05/12/2025     Lab Results   Component Value Date    CHOL 155 09/26/2022     Lab Results   Component Value Date    HDL 34 (L) 09/26/2022     Lab Results " "  Component Value Date    CHHDL 4.6 09/26/2022        Lab Results   Component Value Date    LDLCALC 72 09/26/2022     No results found for: \"VLDL\"  Lab Results   Component Value Date    TRIG 243 (H) 09/26/2022     Lab Results   Component Value Date    HGBA1C 5.5 09/26/2022     Lab Results   Component Value Date    HCT 44.0 05/15/2025       Lab Results   Component Value Date    PSA 2.05 05/15/2025     No components found for: \"CBC\"  Lab Results   Component Value Date    HGBA1C 5.5 09/26/2022       US SCROTUM WITH DOPPLERS, 9/26/24,  - Impression -  Bilateral varicoceles.   Otherwise unremarkable sonographic appearance of the scrotal contents.   Normal arterial and venous flow within both testes.     Scrotal US, 7/10/24,   IMPRESSION:  Testicles and epididymi are within normal limits. Normal flow bilaterally.    Minimal-mild bilateral varicoceles.  PMH:  Past Medical History:   Diagnosis Date    Chest pain 11/30/2021    Malaise and fatigue 01/06/2021        PSH:  Past Surgical History:   Procedure Laterality Date    MR HEAD ANGIO WO IV CONTRAST  2/14/2023    MR HEAD ANGIO WO IV CONTRAST [facility]    OTHER SURGICAL HISTORY  06/10/2021    Appendectomy    OTHER SURGICAL HISTORY  06/10/2021    Tonsillectomy        Medications:    Current Outpatient Medications:     albuterol 90 mcg/actuation inhaler, Inhale 2 puffs every 4 hours if needed for wheezing or shortness of breath., Disp: , Rfl:     aspirin 81 mg chewable tablet, Chew 1 tablet (81 mg) once daily., Disp: , Rfl:     atorvastatin (Lipitor) 10 mg tablet, Take 1 tablet by mouth once daily, Disp: 90 tablet, Rfl: 3    Breo Ellipta 100-25 mcg/dose inhaler, Inhale 1 puff by mouth once daily., Disp: 180 each, Rfl: 3    cholecalciferol (Vitamin D-3) 5,000 Units tablet, Take 1 tablet (5,000 Units) by mouth once every 24 hours., Disp: , Rfl:     Cinnamon 500 mg capsule, Take 1 capsule (500 mg) by mouth once daily., Disp: , Rfl:     cyclobenzaprine (Flexeril) 10 mg " tablet, Take 1 tablet (10 mg) by mouth as needed at bedtime for muscle spasms., Disp: 30 tablet, Rfl: 0    escitalopram (Lexapro) 10 mg tablet, Take 1 tablet (10 mg) by mouth once daily., Disp: 30 tablet, Rfl: 5    escitalopram (Lexapro) 20 mg tablet, Take 1 tablet (20 mg) by mouth once daily., Disp: 30 tablet, Rfl: 5    famotidine (Pepcid) 40 mg tablet, Take 1 tablet (40 mg) by mouth once daily at bedtime., Disp: , Rfl:     ibuprofen (Motrin) capsule, Take 1 capsule (200 mg) by mouth if needed., Disp: , Rfl:     mv-min-folic-K1-lycopen-lutein (Centrum Silver Ultra Men's) 162--300 mcg tablet, Take 1 tablet by mouth once daily., Disp: , Rfl:     ondansetron (Zofran) 4 mg tablet, Take 2 tablets (8 mg) by mouth every 12 hours if needed for nausea or vomiting., Disp: , Rfl:     pantoprazole (ProtoNix) 40 mg EC tablet, Take 1 tablet (40 mg) by mouth once daily in the morning. Take before meals., Disp: , Rfl:     rimegepant (Nurtec ODT) 75 mg tablet,disintegrating, Dissolve 1 tablet (75 mg) in the mouth once daily as needed (Migraine)., Disp: 8 tablet, Rfl: 5    tamsulosin (Flomax) 0.4 mg 24 hr capsule, Take 1 capsule (0.4 mg) by mouth once daily., Disp: 90 capsule, Rfl: 3    Allergy:  Allergies   Allergen Reactions    Grass Pollen Unknown and Rash        Exam  CONSTITUTIONAL:        No acute distress    HEAD:        Normocephalic and atraumatic    CHEST / RESPIRATORY      no excess work of breathing, no respiratory distress,    ABDOMEN / GASTROINTESTINAL:        Abdomen nondistended        Assessment/Plan    #Hypogonadism  -Discussed starting on on Testosterone therapy, including r/b/a in detail However, wife is concerned that the LH is low normal and he has vision changes (increased eye prescription recently), concerned for  with pituitary pathology. Will see endocrinology for a second opinion and Testosterone will be managed by endocrinology.    #Scrotal mass  -repeat scrotal US in 6 months  -will follow up with  is in October with scrotal US      G 2211  Visit complexity inherent to evaluation and management (E&M) associated with medical care services that serve as the continuing focal point for all needed health care services and/or with medical care services that are part of ongoing care related to a patient's single, serious condition or a complex condition.    I have personally reviewed the OARRS report for this patient I have considered the risks of abuse, dependence, addiction and diversion. I believe that it is clinically appropriate for him to be prescribed this medication.”      Scribe Attestation  By signing my name below, I, Yun Aldrich   attest that this documentation has been prepared under the direction and in the presence of Codi Huerta MD.

## 2025-07-10 ENCOUNTER — OFFICE VISIT (OUTPATIENT)
Dept: URGENT CARE | Age: 60
End: 2025-07-10
Payer: COMMERCIAL

## 2025-07-10 VITALS
RESPIRATION RATE: 16 BRPM | HEIGHT: 68 IN | DIASTOLIC BLOOD PRESSURE: 77 MMHG | TEMPERATURE: 98 F | HEART RATE: 81 BPM | WEIGHT: 225 LBS | BODY MASS INDEX: 34.1 KG/M2 | OXYGEN SATURATION: 99 % | SYSTOLIC BLOOD PRESSURE: 126 MMHG

## 2025-07-10 DIAGNOSIS — M70.21 OLECRANON BURSITIS OF RIGHT ELBOW: Primary | ICD-10-CM

## 2025-07-10 ASSESSMENT — PAIN SCALES - GENERAL: PAINLEVEL_OUTOF10: 6

## 2025-07-10 NOTE — PROGRESS NOTES
"Subjective   Patient ID: Boston Daniel is a 60 y.o. male. They present today with a chief complaint of right elbow pain (Has a history of gout, no injury, only doing computer work- for about 3 days).    History of Present Illness  HPI  Patient here for complaints of right elbow discomfort x 4 days. He states sometimes there is radiation of pain down 2nd and 3rd digit. 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 Patient denies injury or surgery to this extremity. Patient has a hx of gout but states this is a different pain.     Past Medical History  Allergies as of 07/10/2025 - Reviewed 07/10/2025   Allergen Reaction Noted    Grass pollen Unknown and Rash 11/20/2023       Prescriptions Prior to Admission[1]     Medical History[2]    Surgical History[3]     reports that he has quit smoking. His smoking use included cigarettes. He started smoking about 23 years ago. He has never used smokeless tobacco. He reports current alcohol use. He reports that he does not use drugs.    Review of Systems  Review of Systems  ROS is negative unless otherwise stated in HPI.                                Objective    Vitals:    07/10/25 1710   BP: 126/77   BP Location: Left arm   Patient Position: Sitting   Pulse: 81   Resp: 16   Temp: 36.7 °C (98 °F)   TempSrc: Oral   SpO2: 99%   Weight: 102 kg (225 lb)   Height: 1.727 m (5' 8\")     No LMP for male patient.    Physical Exam  VS: As documented in the triage note and EMR flowsheet from this visit was reviewed  General: Well appearing. No acute distress.   Eyes:  Extraocular movements grossly intact. No scleral icterus.   Head: Atraumatic. Normocephalic.     Neck: No meningismus. No gross masses. Full movement through range of motion  MSK: To the right posterior aspect of elbow just distal to joint there is a cystic structure that is soft to the touch along olecranon space. It is mildly " uncomfortable to touch. No erythema, wounds, streaking. ROM intact. Muscle strength intact.   Skin: Warm, dry. No rashes  Neuro: CN II-VII intact. A&O x3. Speech fluent. Alert. Moving all extremities. Ambulates with normal gait  Psych: Appropriate mood and affect for situation    Procedures    Point of Care Test & Imaging Results from this visit  No results found for this visit on 07/10/25.   Imaging  No results found.    Cardiology, Vascular, and Other Imaging  No other imaging results found for the past 2 days      Diagnostic study results (if any) were reviewed by Sheeba Donaot PA-C.    Assessment/Plan   Allergies, medications, history, and pertinent labs/EKGs/Imaging reviewed by Sheeba Donato PA-C.     Medical Decision Making  The patient was evaluated for above complaints in [facility]. Differential diagnoses include gout, bursitis. Very low suspicion of gout given level of pain and ROM of joint. The patient has olecranon bursitis of right elbow. Patient educated on treatment plan consisting of rest, compression and OTC tylenol, ibuprofen. Patient provided with return precaution and can follow up with PCP if no improvement.     Orders and Diagnoses  Diagnoses and all orders for this visit:  Olecranon bursitis of right elbow      Medical Admin Record      Patient disposition: Home    Electronically signed by Sheeba Doanto PA-C  5:32 PM           [1] (Not in a hospital admission)   [2]   Past Medical History:  Diagnosis Date    Chest pain 11/30/2021    Malaise and fatigue 01/06/2021   [3]   Past Surgical History:  Procedure Laterality Date    MR HEAD ANGIO WO IV CONTRAST  2/14/2023    MR HEAD ANGIO WO IV CONTRAST [facility]    OTHER SURGICAL HISTORY  06/10/2021    Appendectomy    OTHER SURGICAL HISTORY  06/10/2021    Tonsillectomy

## 2025-07-11 ENCOUNTER — TELEPHONE (OUTPATIENT)
Dept: URGENT CARE | Age: 60
End: 2025-07-11

## 2025-07-11 NOTE — TELEPHONE ENCOUNTER
Called patient this morning and he stated he is feeling ok and has no other questions or concerns post visit.

## 2025-07-16 PROCEDURE — RXMED WILLOW AMBULATORY MEDICATION CHARGE

## 2025-07-17 ENCOUNTER — APPOINTMENT (OUTPATIENT)
Dept: PRIMARY CARE | Facility: CLINIC | Age: 60
End: 2025-07-17
Payer: COMMERCIAL

## 2025-07-17 PROCEDURE — RXMED WILLOW AMBULATORY MEDICATION CHARGE

## 2025-07-19 ENCOUNTER — PHARMACY VISIT (OUTPATIENT)
Dept: PHARMACY | Facility: CLINIC | Age: 60
End: 2025-07-19
Payer: COMMERCIAL

## 2025-08-18 PROCEDURE — RXMED WILLOW AMBULATORY MEDICATION CHARGE

## 2025-08-21 ENCOUNTER — PHARMACY VISIT (OUTPATIENT)
Dept: PHARMACY | Facility: CLINIC | Age: 60
End: 2025-08-21
Payer: COMMERCIAL

## 2025-09-04 ENCOUNTER — APPOINTMENT (OUTPATIENT)
Dept: PRIMARY CARE | Facility: CLINIC | Age: 60
End: 2025-09-04
Payer: COMMERCIAL

## 2025-09-09 ENCOUNTER — APPOINTMENT (OUTPATIENT)
Dept: PRIMARY CARE | Facility: CLINIC | Age: 60
End: 2025-09-09
Payer: COMMERCIAL

## 2025-09-30 ENCOUNTER — APPOINTMENT (OUTPATIENT)
Dept: ENDOCRINOLOGY | Facility: CLINIC | Age: 60
End: 2025-09-30
Payer: COMMERCIAL

## 2025-10-29 ENCOUNTER — APPOINTMENT (OUTPATIENT)
Dept: UROLOGY | Facility: CLINIC | Age: 60
End: 2025-10-29
Payer: COMMERCIAL